# Patient Record
Sex: MALE | Race: WHITE | NOT HISPANIC OR LATINO | Employment: OTHER | URBAN - METROPOLITAN AREA
[De-identification: names, ages, dates, MRNs, and addresses within clinical notes are randomized per-mention and may not be internally consistent; named-entity substitution may affect disease eponyms.]

---

## 2017-01-03 ENCOUNTER — LAB (OUTPATIENT)
Dept: LAB | Facility: CLINIC | Age: 82
End: 2017-01-03
Payer: MEDICARE

## 2017-01-03 ENCOUNTER — ALLSCRIPTS OFFICE VISIT (OUTPATIENT)
Dept: OTHER | Facility: OTHER | Age: 82
End: 2017-01-03

## 2017-01-03 DIAGNOSIS — C83.10 MANTLE CELL LYMPHOMA (HCC): ICD-10-CM

## 2017-01-03 LAB
ALBUMIN SERPL BCP-MCNC: 3.4 G/DL (ref 3.5–5)
ALP SERPL-CCNC: 230 U/L (ref 46–116)
ALT SERPL W P-5'-P-CCNC: 20 U/L (ref 12–78)
ANION GAP SERPL CALCULATED.3IONS-SCNC: 9 MMOL/L (ref 4–13)
AST SERPL W P-5'-P-CCNC: 94 U/L (ref 5–45)
BILIRUB SERPL-MCNC: 0.39 MG/DL (ref 0.2–1)
BUN SERPL-MCNC: 13 MG/DL (ref 5–25)
CALCIUM SERPL-MCNC: 10.1 MG/DL (ref 8.3–10.1)
CHLORIDE SERPL-SCNC: 98 MMOL/L (ref 100–108)
CO2 SERPL-SCNC: 29 MMOL/L (ref 21–32)
CREAT SERPL-MCNC: 1.14 MG/DL (ref 0.6–1.3)
GFR SERPL CREATININE-BSD FRML MDRD: >60 ML/MIN/1.73SQ M
GLUCOSE SERPL-MCNC: 104 MG/DL (ref 65–140)
POTASSIUM SERPL-SCNC: 4.1 MMOL/L (ref 3.5–5.3)
PROT SERPL-MCNC: 7.1 G/DL (ref 6.4–8.2)
SODIUM SERPL-SCNC: 136 MMOL/L (ref 136–145)

## 2017-01-03 PROCEDURE — 85025 COMPLETE CBC W/AUTO DIFF WBC: CPT

## 2017-01-03 PROCEDURE — 85007 BL SMEAR W/DIFF WBC COUNT: CPT

## 2017-01-03 PROCEDURE — 80053 COMPREHEN METABOLIC PANEL: CPT

## 2017-01-03 PROCEDURE — 86850 RBC ANTIBODY SCREEN: CPT | Performed by: INTERNAL MEDICINE

## 2017-01-03 PROCEDURE — 86900 BLOOD TYPING SEROLOGIC ABO: CPT | Performed by: INTERNAL MEDICINE

## 2017-01-03 PROCEDURE — 86901 BLOOD TYPING SEROLOGIC RH(D): CPT | Performed by: INTERNAL MEDICINE

## 2017-01-03 PROCEDURE — 36415 COLL VENOUS BLD VENIPUNCTURE: CPT

## 2017-01-03 PROCEDURE — 85027 COMPLETE CBC AUTOMATED: CPT

## 2017-01-04 ENCOUNTER — LAB REQUISITION (OUTPATIENT)
Dept: LAB | Facility: HOSPITAL | Age: 82
End: 2017-01-04
Payer: MEDICARE

## 2017-01-04 DIAGNOSIS — D64.9 ANEMIA: ICD-10-CM

## 2017-01-04 LAB
ABO GROUP BLD: NORMAL
ACANTHOCYTES BLD QL SMEAR: PRESENT
ANISOCYTOSIS BLD QL SMEAR: PRESENT
BASOPHILS NFR MAR MANUAL: 1 % (ref 0–1)
BLASTS NFR BLD MANUAL: 7 %
BLD GP AB SCN SERPL QL: NEGATIVE
ERYTHROCYTE [DISTWIDTH] IN BLOOD BY AUTOMATED COUNT: 19 % (ref 11.6–15.1)
HCT VFR BLD AUTO: 25.1 % (ref 36.5–49.3)
HGB BLD-MCNC: 7.7 G/DL (ref 12–17)
HYPERCHROMIA BLD QL SMEAR: PRESENT
LYMPHOCYTES # BLD AUTO: 72 % (ref 14–44)
MACROCYTES BLD QL AUTO: PRESENT
MCH RBC QN AUTO: 32 PG (ref 26.8–34.3)
MCHC RBC AUTO-ENTMCNC: 30.7 G/DL (ref 31.4–37.4)
MCV RBC AUTO: 104 FL (ref 82–98)
METAMYELOCYTES NFR BLD MANUAL: 2 % (ref 0–1)
MONOCYTES NFR BLD: 4 % (ref 4–12)
MYELOCYTES NFR BLD MANUAL: 3 % (ref 0–1)
NEUTS SEG NFR BLD AUTO: 10 % (ref 43–75)
NRBC BLD AUTO-RTO: 1 /100 WBCS
PLATELET # BLD AUTO: 84 THOUSANDS/UL (ref 149–390)
PLATELET BLD QL SMEAR: ABNORMAL
PMV BLD AUTO: 10.1 FL (ref 8.9–12.7)
POIKILOCYTOSIS BLD QL SMEAR: PRESENT
RBC # BLD AUTO: 2.41 MILLION/UL (ref 3.88–5.62)
RH BLD: POSITIVE
TOTAL CELLS COUNTED SPEC: 100
VARIANT LYMPHS # BLD AUTO: 2 %
WBC # BLD AUTO: 222.08 THOUSAND/UL (ref 4.31–10.16)

## 2017-01-05 ENCOUNTER — HOSPITAL ENCOUNTER (OUTPATIENT)
Dept: INFUSION CENTER | Facility: CLINIC | Age: 82
Discharge: HOME | End: 2017-01-05
Payer: MEDICARE

## 2017-01-05 VITALS
TEMPERATURE: 97.8 F | SYSTOLIC BLOOD PRESSURE: 120 MMHG | DIASTOLIC BLOOD PRESSURE: 76 MMHG | RESPIRATION RATE: 18 BRPM | HEART RATE: 98 BPM

## 2017-01-05 PROCEDURE — 96365 THER/PROPH/DIAG IV INF INIT: CPT

## 2017-01-05 PROCEDURE — P9040 RBC LEUKOREDUCED IRRADIATED: HCPCS

## 2017-01-05 PROCEDURE — 86920 COMPATIBILITY TEST SPIN: CPT

## 2017-01-05 PROCEDURE — 36430 TRANSFUSION BLD/BLD COMPNT: CPT

## 2017-01-05 RX ORDER — DOXORUBICIN HYDROCHLORIDE 2 MG/ML
44 INJECTION, SOLUTION INTRAVENOUS ONCE
Status: COMPLETED | OUTPATIENT
Start: 2017-01-06 | End: 2017-01-06

## 2017-01-05 RX ORDER — SODIUM CHLORIDE 9 MG/ML
20 INJECTION, SOLUTION INTRAVENOUS CONTINUOUS
Status: DISCONTINUED | OUTPATIENT
Start: 2017-01-06 | End: 2017-01-09 | Stop reason: HOSPADM

## 2017-01-05 RX ORDER — SODIUM CHLORIDE 9 MG/ML
20 INJECTION, SOLUTION INTRAVENOUS CONTINUOUS
Status: DISCONTINUED | OUTPATIENT
Start: 2017-01-05 | End: 2017-01-08 | Stop reason: HOSPADM

## 2017-01-05 RX ORDER — ACETAMINOPHEN 325 MG/1
650 TABLET ORAL ONCE
Status: COMPLETED | OUTPATIENT
Start: 2017-01-05 | End: 2017-01-05

## 2017-01-05 RX ADMIN — SODIUM CHLORIDE 20 ML/HR: 0.9 INJECTION, SOLUTION INTRAVENOUS at 11:04

## 2017-01-05 RX ADMIN — DIPHENHYDRAMINE HYDROCHLORIDE 25 MG: 50 INJECTION, SOLUTION INTRAMUSCULAR; INTRAVENOUS at 11:03

## 2017-01-05 RX ADMIN — Medication 300 UNITS: at 15:15

## 2017-01-05 RX ADMIN — ACETAMINOPHEN 650 MG: 325 TABLET, FILM COATED ORAL at 10:51

## 2017-01-05 NOTE — PLAN OF CARE
Problem: Potential for Falls  Goal: Patient will remain free of falls  INTERVENTIONS:  - Assess patient frequently for physical needs  - Identify cognitive and physical deficits and behaviors that affect risk of falls    - [place] fall precautions as indicated by assessment   - Educate patient/family on patient safety including physical limitations  - Instruct patient to call for assistance with activity based on assessment  - Modify environment to reduce risk of injury  - Consider OT/PT consult to assist with strengthening/mobility   Outcome: Progressing

## 2017-01-05 NOTE — PROGRESS NOTES
Pt tolerated transfusion of 2U PRBC well without any adverse reaction, left unit in stable condition accompanied by his wife  Post blood transfusion d/c instructions given  Pt aware of appt tomorrow in [address] [place] [address] for chemotx

## 2017-01-06 ENCOUNTER — HOSPITAL ENCOUNTER (OUTPATIENT)
Dept: INFUSION CENTER | Facility: CLINIC | Age: 82
Discharge: HOME | End: 2017-01-06
Payer: MEDICARE

## 2017-01-06 VITALS
OXYGEN SATURATION: 96 % | RESPIRATION RATE: 16 BRPM | SYSTOLIC BLOOD PRESSURE: 124 MMHG | BODY MASS INDEX: 28.7 KG/M2 | HEART RATE: 109 BPM | WEIGHT: 162 LBS | DIASTOLIC BLOOD PRESSURE: 58 MMHG | TEMPERATURE: 97.4 F | HEIGHT: 63 IN

## 2017-01-06 LAB
ABO GROUP BLD BPU: NORMAL
ABO GROUP BLD BPU: NORMAL
ANISOCYTOSIS BLD QL SMEAR: PRESENT
BASOPHILS # BLD MANUAL: 0 THOUSAND/UL (ref 0–0.1)
BASOPHILS NFR MAR MANUAL: 0 % (ref 0–1)
BLASTS NFR BLD MANUAL: 61 %
BPU ID: NORMAL
BPU ID: NORMAL
CROSSMATCH: NORMAL
CROSSMATCH: NORMAL
EOSINOPHIL # BLD MANUAL: 2.81 THOUSAND/UL (ref 0–0.4)
EOSINOPHIL NFR BLD MANUAL: 1 % (ref 0–6)
ERYTHROCYTE [DISTWIDTH] IN BLOOD BY AUTOMATED COUNT: 21 % (ref 11.6–15.1)
HCT VFR BLD AUTO: 28 % (ref 36.5–49.3)
HGB BLD-MCNC: 9 G/DL (ref 12–17)
LYMPHOCYTES # BLD AUTO: 14 % (ref 14–44)
LYMPHOCYTES # BLD AUTO: 75.79 THOUSAND/UL (ref 0.6–4.47)
MCH RBC QN AUTO: 31.5 PG (ref 26.8–34.3)
MCHC RBC AUTO-ENTMCNC: 32.1 G/DL (ref 31.4–37.4)
MCV RBC AUTO: 98 FL (ref 82–98)
METAMYELOCYTES NFR BLD MANUAL: 3 % (ref 0–1)
MONOCYTES # BLD AUTO: 14.04 THOUSAND/UL (ref 0–1.22)
MONOCYTES NFR BLD: 5 % (ref 4–12)
NEUTROPHILS # BLD MANUAL: 8.42 THOUSAND/UL (ref 1.85–7.62)
NEUTS SEG NFR BLD AUTO: 3 % (ref 43–75)
NRBC BLD AUTO-RTO: 2 /100 WBC (ref 0–2)
PLATELET # BLD AUTO: 70 THOUSANDS/UL (ref 149–390)
PLATELET BLD QL SMEAR: ABNORMAL
PMV BLD AUTO: 8.8 FL (ref 8.9–12.7)
POIKILOCYTOSIS BLD QL SMEAR: PRESENT
PROMYELOCYTES NFR BLD MANUAL: 2 % (ref 0–0)
RBC # BLD AUTO: 2.86 MILLION/UL (ref 3.88–5.62)
TOTAL CELLS COUNTED SPEC: 100
UNIT DISPENSE STATUS: NORMAL
UNIT DISPENSE STATUS: NORMAL
UNIT PRODUCT CODE: NORMAL
UNIT PRODUCT CODE: NORMAL
UNIT RH: NORMAL
UNIT RH: NORMAL
VARIANT LYMPHS # BLD AUTO: 11 %
WBC # BLD AUTO: 280.7 THOUSAND/UL (ref 4.31–10.16)

## 2017-01-06 PROCEDURE — 85007 BL SMEAR W/DIFF WBC COUNT: CPT | Performed by: INTERNAL MEDICINE

## 2017-01-06 PROCEDURE — 85027 COMPLETE CBC AUTOMATED: CPT | Performed by: INTERNAL MEDICINE

## 2017-01-06 PROCEDURE — 96413 CHEMO IV INFUSION 1 HR: CPT

## 2017-01-06 PROCEDURE — 96417 CHEMO IV INFUS EACH ADDL SEQ: CPT

## 2017-01-06 PROCEDURE — 96367 TX/PROPH/DG ADDL SEQ IV INF: CPT

## 2017-01-06 PROCEDURE — 96411 CHEMO IV PUSH ADDL DRUG: CPT

## 2017-01-06 RX ADMIN — SODIUM CHLORIDE 150 MG: 0.9 INJECTION, SOLUTION INTRAVENOUS at 10:29

## 2017-01-06 RX ADMIN — VINCRISTINE SULFATE 1 MG: 1 INJECTION, SOLUTION INTRAVENOUS at 11:57

## 2017-01-06 RX ADMIN — SODIUM CHLORIDE 6 MG: 9 INJECTION, SOLUTION INTRAVENOUS at 09:23

## 2017-01-06 RX ADMIN — ONDANSETRON: 2 INJECTION INTRAMUSCULAR; INTRAVENOUS at 09:55

## 2017-01-06 RX ADMIN — SODIUM CHLORIDE 20 ML/HR: 0.9 INJECTION, SOLUTION INTRAVENOUS at 08:39

## 2017-01-06 RX ADMIN — HEPARIN 300 UNITS: 100 SYRINGE at 12:27

## 2017-01-06 RX ADMIN — CYCLOPHOSPHAMIDE 712 MG: 1 INJECTION, POWDER, FOR SOLUTION INTRAVENOUS; ORAL at 11:06

## 2017-01-06 RX ADMIN — DOXORUBICIN HYDROCHLORIDE 44 MG: 2 INJECTION, SOLUTION INTRAVENOUS at 11:39

## 2017-01-06 RX ADMIN — DIPHENHYDRAMINE HYDROCHLORIDE 25 MG: 50 INJECTION, SOLUTION INTRAMUSCULAR; INTRAVENOUS at 10:13

## 2017-01-06 NOTE — PROGRESS NOTES
Port accessed for chemo infusion & lab testing  TWQ=483 7 & PLT=70  Notified Davidson Johnston aware & Hyun Nevarez to treat  Tolerated infusion w/out any adverse effects   Refused AVS  Left via w/c accompanied by wife

## 2017-01-09 ENCOUNTER — HOSPITAL ENCOUNTER (OUTPATIENT)
Dept: CT IMAGING | Facility: HOSPITAL | Age: 82
Discharge: HOME | End: 2017-01-09
Attending: INTERNAL MEDICINE
Payer: MEDICARE

## 2017-01-09 ENCOUNTER — GENERIC CONVERSION - ENCOUNTER (OUTPATIENT)
Dept: OTHER | Facility: OTHER | Age: 82
End: 2017-01-09

## 2017-01-09 ENCOUNTER — HOSPITAL ENCOUNTER (OUTPATIENT)
Dept: MRI IMAGING | Facility: CLINIC | Age: 82
Discharge: HOME | End: 2017-01-09
Payer: MEDICARE

## 2017-01-09 VITALS
WEIGHT: 159 LBS | HEIGHT: 63 IN | DIASTOLIC BLOOD PRESSURE: 64 MMHG | SYSTOLIC BLOOD PRESSURE: 123 MMHG | BODY MASS INDEX: 28.17 KG/M2 | RESPIRATION RATE: 18 BRPM | TEMPERATURE: 97.2 F | OXYGEN SATURATION: 97 % | HEART RATE: 74 BPM

## 2017-01-09 DIAGNOSIS — D69.6 THROMBOCYTOPENIA (HCC): ICD-10-CM

## 2017-01-09 DIAGNOSIS — C83.10: ICD-10-CM

## 2017-01-09 DIAGNOSIS — M54.16 RADICULOPATHY OF LUMBAR REGION: ICD-10-CM

## 2017-01-09 DIAGNOSIS — C83.10 MANTLE CELL LYMPHOMA (HCC): ICD-10-CM

## 2017-01-09 LAB
ANISOCYTOSIS BLD QL SMEAR: PRESENT
BASOPHILS # BLD MANUAL: 0 THOUSAND/UL (ref 0–0.1)
BASOPHILS NFR MAR MANUAL: 0 % (ref 0–1)
BLASTS NFR BLD MANUAL: 20 %
EOSINOPHIL # BLD MANUAL: 0 THOUSAND/UL (ref 0–0.4)
EOSINOPHIL NFR BLD MANUAL: 0 % (ref 0–6)
ERYTHROCYTE [DISTWIDTH] IN BLOOD BY AUTOMATED COUNT: 18.7 % (ref 11.6–15.1)
HCT VFR BLD AUTO: 30.5 % (ref 36.5–49.3)
HGB BLD-MCNC: 9.5 G/DL (ref 12–17)
LYMPHOCYTES # BLD AUTO: 14 % (ref 14–44)
LYMPHOCYTES # BLD AUTO: 7.53 THOUSAND/UL (ref 0.6–4.47)
MCH RBC QN AUTO: 30.5 PG (ref 26.8–34.3)
MCHC RBC AUTO-ENTMCNC: 31.1 G/DL (ref 31.4–37.4)
MCV RBC AUTO: 98 FL (ref 82–98)
MONOCYTES # BLD AUTO: 0 THOUSAND/UL (ref 0–1.22)
MONOCYTES NFR BLD: 0 % (ref 4–12)
NEUTROPHILS # BLD MANUAL: 12.37 THOUSAND/UL (ref 1.85–7.62)
NEUTS BAND NFR BLD MANUAL: 4 % (ref 0–8)
NEUTS SEG NFR BLD AUTO: 19 % (ref 43–75)
PLATELET # BLD AUTO: 30 THOUSANDS/UL (ref 149–390)
PLATELET BLD QL SMEAR: ABNORMAL
PMV BLD AUTO: 10.1 FL (ref 8.9–12.7)
POIKILOCYTOSIS BLD QL SMEAR: PRESENT
RBC # BLD AUTO: 3.11 MILLION/UL (ref 3.88–5.62)
TOTAL CELLS COUNTED SPEC: 100
VARIANT LYMPHS # BLD AUTO: 43 %
WBC # BLD AUTO: 53.79 THOUSAND/UL (ref 4.31–10.16)

## 2017-01-09 PROCEDURE — 88305 TISSUE EXAM BY PATHOLOGIST: CPT | Performed by: INTERNAL MEDICINE

## 2017-01-09 PROCEDURE — 38221 DX BONE MARROW BIOPSIES: CPT

## 2017-01-09 PROCEDURE — 88313 SPECIAL STAINS GROUP 2: CPT | Performed by: INTERNAL MEDICINE

## 2017-01-09 PROCEDURE — 88333 PATH CONSLTJ SURG CYTO XM 1: CPT | Performed by: INTERNAL MEDICINE

## 2017-01-09 PROCEDURE — 88237 TISSUE CULTURE BONE MARROW: CPT | Performed by: INTERNAL MEDICINE

## 2017-01-09 PROCEDURE — 38220 DX BONE MARROW ASPIRATIONS: CPT

## 2017-01-09 PROCEDURE — 88360 TUMOR IMMUNOHISTOCHEM/MANUAL: CPT | Performed by: INTERNAL MEDICINE

## 2017-01-09 PROCEDURE — 88342 IMHCHEM/IMCYTCHM 1ST ANTB: CPT | Performed by: INTERNAL MEDICINE

## 2017-01-09 PROCEDURE — 99153 MOD SED SAME PHYS/QHP EA: CPT

## 2017-01-09 PROCEDURE — 85027 COMPLETE CBC AUTOMATED: CPT | Performed by: INTERNAL MEDICINE

## 2017-01-09 PROCEDURE — 88365 INSITU HYBRIDIZATION (FISH): CPT | Performed by: INTERNAL MEDICINE

## 2017-01-09 PROCEDURE — 99152 MOD SED SAME PHYS/QHP 5/>YRS: CPT

## 2017-01-09 PROCEDURE — 88374 M/PHMTRC ALYS ISHQUANT/SEMIQ: CPT | Performed by: INTERNAL MEDICINE

## 2017-01-09 PROCEDURE — 81406 MOPATH PROCEDURE LEVEL 7: CPT | Performed by: INTERNAL MEDICINE

## 2017-01-09 PROCEDURE — 85007 BL SMEAR W/DIFF WBC COUNT: CPT | Performed by: INTERNAL MEDICINE

## 2017-01-09 PROCEDURE — 88185 FLOWCYTOMETRY/TC ADD-ON: CPT

## 2017-01-09 PROCEDURE — 77012 CT SCAN FOR NEEDLE BIOPSY: CPT

## 2017-01-09 PROCEDURE — 88341 IMHCHEM/IMCYTCHM EA ADD ANTB: CPT | Performed by: INTERNAL MEDICINE

## 2017-01-09 PROCEDURE — A9585 GADOBUTROL INJECTION: HCPCS | Performed by: INTERNAL MEDICINE

## 2017-01-09 PROCEDURE — 81450 HL NEO GSAP 5-50DNA/DNA&RNA: CPT | Performed by: INTERNAL MEDICINE

## 2017-01-09 PROCEDURE — 88280 CHROMOSOME KARYOTYPE STUDY: CPT | Performed by: INTERNAL MEDICINE

## 2017-01-09 PROCEDURE — 88184 FLOWCYTOMETRY/ TC 1 MARKER: CPT | Performed by: INTERNAL MEDICINE

## 2017-01-09 PROCEDURE — 85097 BONE MARROW INTERPRETATION: CPT | Performed by: INTERNAL MEDICINE

## 2017-01-09 PROCEDURE — 72158 MRI LUMBAR SPINE W/O & W/DYE: CPT

## 2017-01-09 PROCEDURE — 88311 DECALCIFY TISSUE: CPT | Performed by: INTERNAL MEDICINE

## 2017-01-09 RX ORDER — MIDAZOLAM HYDROCHLORIDE 1 MG/ML
INJECTION INTRAMUSCULAR; INTRAVENOUS CODE/TRAUMA/SEDATION MEDICATION
Status: COMPLETED | OUTPATIENT
Start: 2017-01-09 | End: 2017-01-09

## 2017-01-09 RX ORDER — FENTANYL CITRATE 50 UG/ML
INJECTION, SOLUTION INTRAMUSCULAR; INTRAVENOUS CODE/TRAUMA/SEDATION MEDICATION
Status: COMPLETED | OUTPATIENT
Start: 2017-01-09 | End: 2017-01-09

## 2017-01-09 RX ORDER — SODIUM CHLORIDE 9 MG/ML
75 INJECTION, SOLUTION INTRAVENOUS CONTINUOUS
Status: DISCONTINUED | OUTPATIENT
Start: 2017-01-09 | End: 2017-01-10 | Stop reason: HOSPADM

## 2017-01-09 RX ADMIN — MIDAZOLAM HYDROCHLORIDE 1 MG: 1 INJECTION, SOLUTION INTRAMUSCULAR; INTRAVENOUS at 08:34

## 2017-01-09 RX ADMIN — FENTANYL CITRATE 50 MCG: 50 INJECTION INTRAMUSCULAR; INTRAVENOUS at 08:34

## 2017-01-09 RX ADMIN — SODIUM CHLORIDE 75 ML/HR: 0.9 INJECTION, SOLUTION INTRAVENOUS at 07:59

## 2017-01-09 RX ADMIN — GADOBUTROL 7 ML: 604.72 INJECTION INTRAVENOUS at 14:54

## 2017-01-11 LAB — SCAN RESULT: NORMAL

## 2017-01-11 RX ORDER — SODIUM CHLORIDE 9 MG/ML
20 INJECTION, SOLUTION INTRAVENOUS CONTINUOUS
Status: DISCONTINUED | OUTPATIENT
Start: 2017-01-12 | End: 2017-01-15 | Stop reason: HOSPADM

## 2017-01-12 ENCOUNTER — HOSPITAL ENCOUNTER (OUTPATIENT)
Dept: INFUSION CENTER | Facility: CLINIC | Age: 82
Discharge: HOME | End: 2017-01-12
Payer: MEDICARE

## 2017-01-12 ENCOUNTER — ALLSCRIPTS OFFICE VISIT (OUTPATIENT)
Dept: OTHER | Facility: OTHER | Age: 82
End: 2017-01-12

## 2017-01-12 VITALS
HEART RATE: 100 BPM | TEMPERATURE: 97.9 F | RESPIRATION RATE: 18 BRPM | DIASTOLIC BLOOD PRESSURE: 52 MMHG | SYSTOLIC BLOOD PRESSURE: 110 MMHG

## 2017-01-12 LAB
ABO GROUP BLD BPU: NORMAL
ANISOCYTOSIS BLD QL SMEAR: PRESENT
BASOPHILS # BLD MANUAL: 0 THOUSAND/UL (ref 0–0.1)
BASOPHILS NFR MAR MANUAL: 0 % (ref 0–1)
BLASTS NFR BLD MANUAL: 13 %
BPU ID: NORMAL
EOSINOPHIL # BLD MANUAL: 0 THOUSAND/UL (ref 0–0.4)
EOSINOPHIL NFR BLD MANUAL: 0 % (ref 0–6)
ERYTHROCYTE [DISTWIDTH] IN BLOOD BY AUTOMATED COUNT: 17.8 % (ref 11.6–15.1)
HCT VFR BLD AUTO: 29.9 % (ref 36.5–49.3)
HGB BLD-MCNC: 9.2 G/DL (ref 12–17)
LYMPHOCYTES # BLD AUTO: 10.94 THOUSAND/UL (ref 0.6–4.47)
LYMPHOCYTES # BLD AUTO: 39 % (ref 14–44)
MCH RBC QN AUTO: 30.3 PG (ref 26.8–34.3)
MCHC RBC AUTO-ENTMCNC: 30.8 G/DL (ref 31.4–37.4)
MCV RBC AUTO: 98 FL (ref 82–98)
MONOCYTES # BLD AUTO: 3.36 THOUSAND/UL (ref 0–1.22)
MONOCYTES NFR BLD: 12 % (ref 4–12)
NEUTROPHILS # BLD MANUAL: 3.65 THOUSAND/UL (ref 1.85–7.62)
NEUTS SEG NFR BLD AUTO: 13 % (ref 43–75)
NRBC BLD AUTO-RTO: 1 /100 WBC (ref 0–2)
PLATELET # BLD AUTO: 22 THOUSANDS/UL (ref 149–390)
PLATELET BLD QL SMEAR: ABNORMAL
PMV BLD AUTO: 11.5 FL (ref 8.9–12.7)
RBC # BLD AUTO: 3.04 MILLION/UL (ref 3.88–5.62)
TOTAL CELLS COUNTED SPEC: 100
UNIT DISPENSE STATUS: NORMAL
UNIT PRODUCT CODE: NORMAL
UNIT RH: NORMAL
VARIANT LYMPHS # BLD AUTO: 23 %
WBC # BLD AUTO: 28.04 THOUSAND/UL (ref 4.31–10.16)

## 2017-01-12 PROCEDURE — P9037 PLATE PHERES LEUKOREDU IRRAD: HCPCS

## 2017-01-12 PROCEDURE — 36430 TRANSFUSION BLD/BLD COMPNT: CPT

## 2017-01-12 PROCEDURE — 85027 COMPLETE CBC AUTOMATED: CPT | Performed by: INTERNAL MEDICINE

## 2017-01-12 PROCEDURE — 96365 THER/PROPH/DIAG IV INF INIT: CPT

## 2017-01-12 PROCEDURE — 85007 BL SMEAR W/DIFF WBC COUNT: CPT | Performed by: INTERNAL MEDICINE

## 2017-01-12 RX ORDER — ACETAMINOPHEN 325 MG/1
650 TABLET ORAL ONCE
Status: COMPLETED | OUTPATIENT
Start: 2017-01-12 | End: 2017-01-12

## 2017-01-12 RX ADMIN — HEPARIN 300 UNITS: 100 SYRINGE at 10:00

## 2017-01-12 RX ADMIN — DIPHENHYDRAMINE HYDROCHLORIDE 25 MG: 50 INJECTION, SOLUTION INTRAMUSCULAR; INTRAVENOUS at 09:07

## 2017-01-12 RX ADMIN — ACETAMINOPHEN 650 MG: 325 TABLET ORAL at 09:30

## 2017-01-12 RX ADMIN — SODIUM CHLORIDE 20 ML/HR: 900 INJECTION, SOLUTION INTRAVENOUS at 08:15

## 2017-01-13 ENCOUNTER — HOSPITAL ENCOUNTER (OUTPATIENT)
Dept: NON INVASIVE DIAGNOSTICS | Facility: CLINIC | Age: 82
Discharge: HOME | End: 2017-01-13
Payer: MEDICARE

## 2017-01-13 DIAGNOSIS — C83.10 MANTLE CELL LYMPHOMA (HCC): ICD-10-CM

## 2017-01-13 LAB
ABO GROUP BLD BPU: NORMAL
BPU ID: NORMAL
UNIT DISPENSE STATUS: NORMAL
UNIT PRODUCT CODE: NORMAL
UNIT RH: NORMAL

## 2017-01-13 PROCEDURE — 93306 TTE W/DOPPLER COMPLETE: CPT

## 2017-01-16 LAB
MISCELLANEOUS LAB TEST RESULT: NORMAL
MISCELLANEOUS LAB TEST RESULT: NORMAL

## 2017-01-17 ENCOUNTER — TRANSCRIBE ORDERS (OUTPATIENT)
Dept: LAB | Facility: CLINIC | Age: 82
End: 2017-01-17

## 2017-01-17 ENCOUNTER — APPOINTMENT (OUTPATIENT)
Dept: LAB | Facility: CLINIC | Age: 82
End: 2017-01-17
Payer: MEDICARE

## 2017-01-17 DIAGNOSIS — C83.10 MANTLE CELL LYMPHOMA (HCC): ICD-10-CM

## 2017-01-17 LAB
ALBUMIN SERPL BCP-MCNC: 3.4 G/DL (ref 3.5–5)
ALP SERPL-CCNC: 116 U/L (ref 46–116)
ALT SERPL W P-5'-P-CCNC: 21 U/L (ref 12–78)
ANION GAP SERPL CALCULATED.3IONS-SCNC: 6 MMOL/L (ref 4–13)
ANISOCYTOSIS BLD QL SMEAR: PRESENT
AST SERPL W P-5'-P-CCNC: 14 U/L (ref 5–45)
BASOPHILS # BLD MANUAL: 0 THOUSAND/UL (ref 0–0.1)
BASOPHILS NFR MAR MANUAL: 0 % (ref 0–1)
BILIRUB SERPL-MCNC: 0.49 MG/DL (ref 0.2–1)
BLASTS NFR BLD MANUAL: 15 %
BUN SERPL-MCNC: 18 MG/DL (ref 5–25)
CALCIUM SERPL-MCNC: 10.1 MG/DL (ref 8.3–10.1)
CHLORIDE SERPL-SCNC: 98 MMOL/L (ref 100–108)
CO2 SERPL-SCNC: 30 MMOL/L (ref 21–32)
CREAT SERPL-MCNC: 1.05 MG/DL (ref 0.6–1.3)
EOSINOPHIL # BLD MANUAL: 0 THOUSAND/UL (ref 0–0.4)
EOSINOPHIL NFR BLD MANUAL: 0 % (ref 0–6)
ERYTHROCYTE [DISTWIDTH] IN BLOOD BY AUTOMATED COUNT: 18.6 % (ref 11.6–15.1)
GFR SERPL CREATININE-BSD FRML MDRD: >60 ML/MIN/1.73SQ M
GLUCOSE SERPL-MCNC: 99 MG/DL (ref 65–140)
HCT VFR BLD AUTO: 29.1 % (ref 36.5–49.3)
HGB BLD-MCNC: 9.2 G/DL (ref 12–17)
LYMPHOCYTES # BLD AUTO: 20.74 THOUSAND/UL (ref 0.6–4.47)
LYMPHOCYTES # BLD AUTO: 70 % (ref 14–44)
MCH RBC QN AUTO: 31.1 PG (ref 26.8–34.3)
MCHC RBC AUTO-ENTMCNC: 31.6 G/DL (ref 31.4–37.4)
MCV RBC AUTO: 98 FL (ref 82–98)
METAMYELOCYTES NFR BLD MANUAL: 1 % (ref 0–1)
MISCELLANEOUS LAB TEST RESULT: NORMAL
MONOCYTES # BLD AUTO: 0 THOUSAND/UL (ref 0–1.22)
MONOCYTES NFR BLD: 0 % (ref 4–12)
MYELOCYTES NFR BLD MANUAL: 1 % (ref 0–1)
NEUTROPHILS # BLD MANUAL: 0.59 THOUSAND/UL (ref 1.85–7.62)
NEUTS SEG NFR BLD AUTO: 2 % (ref 43–75)
NRBC BLD AUTO-RTO: 0 /100 WBCS
PLATELET # BLD AUTO: 38 THOUSANDS/UL (ref 149–390)
PLATELET BLD QL SMEAR: ABNORMAL
PMV BLD AUTO: 11 FL (ref 8.9–12.7)
POIKILOCYTOSIS BLD QL SMEAR: PRESENT
POTASSIUM SERPL-SCNC: 4.5 MMOL/L (ref 3.5–5.3)
PROT SERPL-MCNC: 7.1 G/DL (ref 6.4–8.2)
RBC # BLD AUTO: 2.96 MILLION/UL (ref 3.88–5.62)
RBC MORPH BLD: PRESENT
SCAN RESULT: NORMAL
SMUDGE CELLS BLD QL SMEAR: PRESENT
SODIUM SERPL-SCNC: 134 MMOL/L (ref 136–145)
VARIANT LYMPHS # BLD AUTO: 11 %
WBC # BLD AUTO: 29.63 THOUSAND/UL (ref 4.31–10.16)

## 2017-01-17 PROCEDURE — 85027 COMPLETE CBC AUTOMATED: CPT

## 2017-01-17 PROCEDURE — 80053 COMPREHEN METABOLIC PANEL: CPT

## 2017-01-17 PROCEDURE — 85007 BL SMEAR W/DIFF WBC COUNT: CPT

## 2017-01-17 PROCEDURE — 36415 COLL VENOUS BLD VENIPUNCTURE: CPT

## 2017-01-20 ENCOUNTER — APPOINTMENT (EMERGENCY)
Dept: RADIOLOGY | Facility: HOSPITAL | Age: 82
DRG: 841 | End: 2017-01-20
Payer: MEDICARE

## 2017-01-20 ENCOUNTER — HOSPITAL ENCOUNTER (INPATIENT)
Facility: HOSPITAL | Age: 82
LOS: 2 days | DRG: 841 | End: 2017-01-22
Attending: EMERGENCY MEDICINE | Admitting: INTERNAL MEDICINE
Payer: MEDICARE

## 2017-01-20 DIAGNOSIS — R63.4 WEIGHT LOSS: ICD-10-CM

## 2017-01-20 DIAGNOSIS — M54.16 LUMBAR RADICULOPATHY: ICD-10-CM

## 2017-01-20 DIAGNOSIS — E86.0 DEHYDRATION: Primary | ICD-10-CM

## 2017-01-20 DIAGNOSIS — R53.83 FATIGUE: ICD-10-CM

## 2017-01-20 DIAGNOSIS — D64.9 ANEMIA: ICD-10-CM

## 2017-01-20 DIAGNOSIS — D72.829 LEUKOCYTOSIS: ICD-10-CM

## 2017-01-20 DIAGNOSIS — C83.10 MANTLE CELL LYMPHOMA (HCC): ICD-10-CM

## 2017-01-20 DIAGNOSIS — N28.9 ACUTE RENAL INSUFFICIENCY: ICD-10-CM

## 2017-01-20 DIAGNOSIS — D69.6 THROMBOCYTOPENIA (HCC): ICD-10-CM

## 2017-01-20 PROBLEM — R29.898 WEAKNESS OF EXTREMITY: Status: ACTIVE | Noted: 2017-01-20

## 2017-01-20 LAB
ALBUMIN SERPL BCP-MCNC: 3.4 G/DL (ref 3.5–5)
ALP SERPL-CCNC: 201 U/L (ref 46–116)
ALT SERPL W P-5'-P-CCNC: 22 U/L (ref 12–78)
ANION GAP SERPL CALCULATED.3IONS-SCNC: 10 MMOL/L (ref 4–13)
AST SERPL W P-5'-P-CCNC: 91 U/L (ref 5–45)
BASOPHILS # BLD MANUAL: 0 THOUSAND/UL (ref 0–0.1)
BASOPHILS NFR MAR MANUAL: 0 % (ref 0–1)
BILIRUB SERPL-MCNC: 0.5 MG/DL (ref 0.2–1)
BUN SERPL-MCNC: 23 MG/DL (ref 5–25)
CALCIUM SERPL-MCNC: 10 MG/DL (ref 8.3–10.1)
CHLORIDE SERPL-SCNC: 95 MMOL/L (ref 100–108)
CO2 SERPL-SCNC: 29 MMOL/L (ref 21–32)
CREAT SERPL-MCNC: 1.46 MG/DL (ref 0.6–1.3)
EOSINOPHIL # BLD MANUAL: 1.33 THOUSAND/UL (ref 0–0.4)
EOSINOPHIL NFR BLD MANUAL: 1 % (ref 0–6)
ERYTHROCYTE [DISTWIDTH] IN BLOOD BY AUTOMATED COUNT: 19.3 % (ref 11.6–15.1)
GFR SERPL CREATININE-BSD FRML MDRD: 46.5 ML/MIN/1.73SQ M
GLUCOSE SERPL-MCNC: 107 MG/DL (ref 65–140)
HCT VFR BLD AUTO: 27.5 % (ref 36.5–49.3)
HGB BLD-MCNC: 8.5 G/DL (ref 12–17)
LYMPHOCYTES # BLD AUTO: 11 % (ref 14–44)
LYMPHOCYTES # BLD AUTO: 14.68 THOUSAND/UL (ref 0.6–4.47)
MAGNESIUM SERPL-MCNC: 2.5 MG/DL (ref 1.6–2.6)
MCH RBC QN AUTO: 30.5 PG (ref 26.8–34.3)
MCHC RBC AUTO-ENTMCNC: 30.9 G/DL (ref 31.4–37.4)
MCV RBC AUTO: 99 FL (ref 82–98)
METAMYELOCYTES NFR BLD MANUAL: 1 % (ref 0–1)
MONOCYTES # BLD AUTO: 8.01 THOUSAND/UL (ref 0–1.22)
MONOCYTES NFR BLD: 6 % (ref 4–12)
NEUTROPHILS # BLD MANUAL: 1.33 THOUSAND/UL (ref 1.85–7.62)
NEUTS SEG NFR BLD AUTO: 1 % (ref 43–75)
NRBC BLD AUTO-RTO: 0 /100 WBCS
PATHOLOGY REVIEW: YES
PHOSPHATE SERPL-MCNC: 1.6 MG/DL (ref 2.3–4.1)
PLATELET # BLD AUTO: 64 THOUSANDS/UL (ref 149–390)
PLATELET BLD QL SMEAR: ABNORMAL
PMV BLD AUTO: 10.5 FL (ref 8.9–12.7)
POTASSIUM SERPL-SCNC: 4.3 MMOL/L (ref 3.5–5.3)
PROT SERPL-MCNC: 7.2 G/DL (ref 6.4–8.2)
RBC # BLD AUTO: 2.79 MILLION/UL (ref 3.88–5.62)
SODIUM SERPL-SCNC: 134 MMOL/L (ref 136–145)
TOTAL CELLS COUNTED SPEC: 100
TROPONIN I SERPL-MCNC: <0.02 NG/ML
UNIDENT CELLS # BLD: 66 % (ref 0–0)
VARIANT LYMPHS # BLD AUTO: 14 %
WBC # BLD AUTO: 133.47 THOUSAND/UL (ref 4.31–10.16)

## 2017-01-20 PROCEDURE — 71020 HB CHEST X-RAY 2VW FRONTAL&LATL: CPT

## 2017-01-20 PROCEDURE — 96374 THER/PROPH/DIAG INJ IV PUSH: CPT

## 2017-01-20 PROCEDURE — 87040 BLOOD CULTURE FOR BACTERIA: CPT | Performed by: INTERNAL MEDICINE

## 2017-01-20 PROCEDURE — 84484 ASSAY OF TROPONIN QUANT: CPT | Performed by: EMERGENCY MEDICINE

## 2017-01-20 PROCEDURE — 93005 ELECTROCARDIOGRAM TRACING: CPT | Performed by: EMERGENCY MEDICINE

## 2017-01-20 PROCEDURE — 96375 TX/PRO/DX INJ NEW DRUG ADDON: CPT

## 2017-01-20 PROCEDURE — 85027 COMPLETE CBC AUTOMATED: CPT | Performed by: EMERGENCY MEDICINE

## 2017-01-20 PROCEDURE — 36415 COLL VENOUS BLD VENIPUNCTURE: CPT | Performed by: EMERGENCY MEDICINE

## 2017-01-20 PROCEDURE — 99285 EMERGENCY DEPT VISIT HI MDM: CPT

## 2017-01-20 PROCEDURE — 96376 TX/PRO/DX INJ SAME DRUG ADON: CPT

## 2017-01-20 PROCEDURE — 80053 COMPREHEN METABOLIC PANEL: CPT | Performed by: EMERGENCY MEDICINE

## 2017-01-20 PROCEDURE — 84100 ASSAY OF PHOSPHORUS: CPT | Performed by: EMERGENCY MEDICINE

## 2017-01-20 PROCEDURE — 36415 COLL VENOUS BLD VENIPUNCTURE: CPT | Performed by: INTERNAL MEDICINE

## 2017-01-20 PROCEDURE — 85007 BL SMEAR W/DIFF WBC COUNT: CPT | Performed by: EMERGENCY MEDICINE

## 2017-01-20 PROCEDURE — 96361 HYDRATE IV INFUSION ADD-ON: CPT

## 2017-01-20 PROCEDURE — 85025 COMPLETE CBC W/AUTO DIFF WBC: CPT | Performed by: EMERGENCY MEDICINE

## 2017-01-20 PROCEDURE — 83735 ASSAY OF MAGNESIUM: CPT | Performed by: EMERGENCY MEDICINE

## 2017-01-20 RX ORDER — ONDANSETRON 2 MG/ML
4 INJECTION INTRAMUSCULAR; INTRAVENOUS ONCE
Status: COMPLETED | OUTPATIENT
Start: 2017-01-20 | End: 2017-01-20

## 2017-01-20 RX ORDER — DUTASTERIDE 0.5 MG/1
0.5 CAPSULE, LIQUID FILLED ORAL DAILY
COMMUNITY
End: 2017-01-25 | Stop reason: HOSPADM

## 2017-01-20 RX ORDER — SODIUM CHLORIDE 9 MG/ML
100 INJECTION, SOLUTION INTRAVENOUS CONTINUOUS
Status: DISCONTINUED | OUTPATIENT
Start: 2017-01-20 | End: 2017-01-21

## 2017-01-20 RX ORDER — DOCUSATE SODIUM 100 MG/1
100 CAPSULE, LIQUID FILLED ORAL 2 TIMES DAILY
Status: DISCONTINUED | OUTPATIENT
Start: 2017-01-20 | End: 2017-01-22 | Stop reason: HOSPADM

## 2017-01-20 RX ORDER — ESCITALOPRAM OXALATE 10 MG/1
10 TABLET ORAL DAILY
COMMUNITY

## 2017-01-20 RX ORDER — MORPHINE SULFATE 4 MG/ML
4 INJECTION, SOLUTION INTRAMUSCULAR; INTRAVENOUS ONCE
Status: COMPLETED | OUTPATIENT
Start: 2017-01-20 | End: 2017-01-20

## 2017-01-20 RX ORDER — ONDANSETRON 4 MG/1
4 TABLET, FILM COATED ORAL EVERY 8 HOURS PRN
COMMUNITY

## 2017-01-20 RX ORDER — LOSARTAN POTASSIUM 25 MG/1
25 TABLET ORAL DAILY
COMMUNITY
End: 2017-01-25 | Stop reason: HOSPADM

## 2017-01-20 RX ORDER — MORPHINE SULFATE 2 MG/ML
2 INJECTION, SOLUTION INTRAMUSCULAR; INTRAVENOUS
Status: DISCONTINUED | OUTPATIENT
Start: 2017-01-20 | End: 2017-01-22 | Stop reason: HOSPADM

## 2017-01-20 RX ORDER — TAMSULOSIN HYDROCHLORIDE 0.4 MG/1
0.4 CAPSULE ORAL
Status: DISCONTINUED | OUTPATIENT
Start: 2017-01-20 | End: 2017-01-22 | Stop reason: HOSPADM

## 2017-01-20 RX ORDER — ONDANSETRON 2 MG/ML
4 INJECTION INTRAMUSCULAR; INTRAVENOUS ONCE AS NEEDED
Status: DISCONTINUED | OUTPATIENT
Start: 2017-01-20 | End: 2017-01-22 | Stop reason: HOSPADM

## 2017-01-20 RX ORDER — ACETAMINOPHEN 325 MG/1
650 TABLET ORAL ONCE
Status: COMPLETED | OUTPATIENT
Start: 2017-01-20 | End: 2017-01-20

## 2017-01-20 RX ORDER — ONDANSETRON 2 MG/ML
4 INJECTION INTRAMUSCULAR; INTRAVENOUS EVERY 6 HOURS PRN
Status: DISCONTINUED | OUTPATIENT
Start: 2017-01-20 | End: 2017-01-22 | Stop reason: HOSPADM

## 2017-01-20 RX ORDER — SIMVASTATIN 10 MG
10 TABLET ORAL
COMMUNITY
End: 2017-01-25 | Stop reason: HOSPADM

## 2017-01-20 RX ORDER — ONDANSETRON 2 MG/ML
INJECTION INTRAMUSCULAR; INTRAVENOUS
Status: COMPLETED
Start: 2017-01-20 | End: 2017-01-20

## 2017-01-20 RX ORDER — ESCITALOPRAM OXALATE 10 MG/1
10 TABLET ORAL DAILY
Status: DISCONTINUED | OUTPATIENT
Start: 2017-01-20 | End: 2017-01-22 | Stop reason: HOSPADM

## 2017-01-20 RX ORDER — ACETAMINOPHEN 325 MG/1
650 TABLET ORAL EVERY 6 HOURS PRN
Status: DISCONTINUED | OUTPATIENT
Start: 2017-01-20 | End: 2017-01-22 | Stop reason: HOSPADM

## 2017-01-20 RX ORDER — OXYCODONE HYDROCHLORIDE 5 MG/1
5 TABLET ORAL EVERY 4 HOURS PRN
Status: DISCONTINUED | OUTPATIENT
Start: 2017-01-20 | End: 2017-01-22 | Stop reason: HOSPADM

## 2017-01-20 RX ORDER — FUROSEMIDE 40 MG/1
40 TABLET ORAL 2 TIMES DAILY
COMMUNITY
End: 2017-01-25 | Stop reason: HOSPADM

## 2017-01-20 RX ORDER — MORPHINE SULFATE 4 MG/ML
4 INJECTION, SOLUTION INTRAMUSCULAR; INTRAVENOUS ONCE AS NEEDED
Status: COMPLETED | OUTPATIENT
Start: 2017-01-20 | End: 2017-01-21

## 2017-01-20 RX ORDER — TAMSULOSIN HYDROCHLORIDE 0.4 MG/1
0.4 CAPSULE ORAL
COMMUNITY
End: 2017-01-25 | Stop reason: HOSPADM

## 2017-01-20 RX ORDER — POLYETHYLENE GLYCOL 3350 17 G/17G
17 POWDER, FOR SOLUTION ORAL DAILY
Status: DISCONTINUED | OUTPATIENT
Start: 2017-01-20 | End: 2017-01-22 | Stop reason: HOSPADM

## 2017-01-20 RX ORDER — POTASSIUM CHLORIDE 750 MG/1
10 TABLET, FILM COATED, EXTENDED RELEASE ORAL 2 TIMES DAILY
COMMUNITY
End: 2017-01-25 | Stop reason: HOSPADM

## 2017-01-20 RX ORDER — SENNOSIDES 8.6 MG
2 TABLET ORAL
Status: DISCONTINUED | OUTPATIENT
Start: 2017-01-20 | End: 2017-01-22 | Stop reason: HOSPADM

## 2017-01-20 RX ADMIN — ONDANSETRON 4 MG: 2 INJECTION INTRAMUSCULAR; INTRAVENOUS at 11:26

## 2017-01-20 RX ADMIN — OXYCODONE HYDROCHLORIDE 5 MG: 5 TABLET ORAL at 21:27

## 2017-01-20 RX ADMIN — ESCITALOPRAM OXALATE 10 MG: 10 TABLET ORAL at 16:24

## 2017-01-20 RX ADMIN — METOPROLOL TARTRATE 25 MG: 25 TABLET ORAL at 16:09

## 2017-01-20 RX ADMIN — MORPHINE SULFATE 2 MG: 2 INJECTION, SOLUTION INTRAMUSCULAR; INTRAVENOUS at 15:06

## 2017-01-20 RX ADMIN — DOCUSATE SODIUM 100 MG: 100 CAPSULE, LIQUID FILLED ORAL at 18:32

## 2017-01-20 RX ADMIN — ACETAMINOPHEN 650 MG: 325 TABLET ORAL at 11:11

## 2017-01-20 RX ADMIN — SODIUM CHLORIDE 1000 ML: 0.9 INJECTION, SOLUTION INTRAVENOUS at 10:34

## 2017-01-20 RX ADMIN — MORPHINE SULFATE 4 MG: 4 INJECTION, SOLUTION INTRAMUSCULAR; INTRAVENOUS at 11:19

## 2017-01-20 RX ADMIN — SENNOSIDES 17.2 MG: 8.6 TABLET, FILM COATED ORAL at 21:29

## 2017-01-20 RX ADMIN — ONDANSETRON 4 MG: 2 INJECTION INTRAMUSCULAR; INTRAVENOUS at 10:34

## 2017-01-20 RX ADMIN — MORPHINE SULFATE 2 MG: 2 INJECTION, SOLUTION INTRAMUSCULAR; INTRAVENOUS at 19:27

## 2017-01-20 RX ADMIN — TAMSULOSIN HYDROCHLORIDE 0.4 MG: 0.4 CAPSULE ORAL at 16:25

## 2017-01-20 RX ADMIN — POLYETHYLENE GLYCOL 3350 17 G: 17 POWDER, FOR SOLUTION ORAL at 16:09

## 2017-01-20 RX ADMIN — SODIUM CHLORIDE 100 ML/HR: 0.9 INJECTION, SOLUTION INTRAVENOUS at 16:13

## 2017-01-20 RX ADMIN — DIBASIC SODIUM PHOSPHATE, MONOBASIC POTASSIUM PHOSPHATE AND MONOBASIC SODIUM PHOSPHATE 1 TABLET: 852; 155; 130 TABLET ORAL at 16:24

## 2017-01-21 LAB
ABO GROUP BLD BPU: NORMAL
ABO GROUP BLD BPU: NORMAL
ABO GROUP BLD: NORMAL
ANION GAP SERPL CALCULATED.3IONS-SCNC: 11 MMOL/L (ref 4–13)
BILIRUB UR QL STRIP: NEGATIVE
BLD GP AB SCN SERPL QL: NEGATIVE
BPU ID: NORMAL
BPU ID: NORMAL
BUN SERPL-MCNC: 21 MG/DL (ref 5–25)
CALCIUM SERPL-MCNC: 8.7 MG/DL (ref 8.3–10.1)
CHLORIDE SERPL-SCNC: 100 MMOL/L (ref 100–108)
CLARITY UR: ABNORMAL
CO2 SERPL-SCNC: 27 MMOL/L (ref 21–32)
COLOR UR: YELLOW
CREAT SERPL-MCNC: 1.12 MG/DL (ref 0.6–1.3)
CROSSMATCH: NORMAL
CROSSMATCH: NORMAL
ERYTHROCYTE [DISTWIDTH] IN BLOOD BY AUTOMATED COUNT: 19.6 % (ref 11.6–15.1)
GFR SERPL CREATININE-BSD FRML MDRD: >60 ML/MIN/1.73SQ M
GLUCOSE SERPL-MCNC: 88 MG/DL (ref 65–140)
GLUCOSE UR STRIP-MCNC: NEGATIVE MG/DL
HCT VFR BLD AUTO: 23.5 % (ref 36.5–49.3)
HGB BLD-MCNC: 7.3 G/DL (ref 12–17)
HGB UR QL STRIP.AUTO: NEGATIVE
KETONES UR STRIP-MCNC: ABNORMAL MG/DL
LEUKOCYTE ESTERASE UR QL STRIP: NEGATIVE
MAGNESIUM SERPL-MCNC: 2.3 MG/DL (ref 1.6–2.6)
MCH RBC QN AUTO: 30.9 PG (ref 26.8–34.3)
MCHC RBC AUTO-ENTMCNC: 31.1 G/DL (ref 31.4–37.4)
MCV RBC AUTO: 100 FL (ref 82–98)
NITRITE UR QL STRIP: NEGATIVE
PH UR STRIP.AUTO: 5.5 [PH] (ref 4.5–8)
PLATELET # BLD AUTO: 53 THOUSANDS/UL (ref 149–390)
PMV BLD AUTO: 9.4 FL (ref 8.9–12.7)
POTASSIUM SERPL-SCNC: 3.6 MMOL/L (ref 3.5–5.3)
PROT UR STRIP-MCNC: NEGATIVE MG/DL
RBC # BLD AUTO: 2.36 MILLION/UL (ref 3.88–5.62)
RH BLD: POSITIVE
SODIUM SERPL-SCNC: 138 MMOL/L (ref 136–145)
SP GR UR STRIP.AUTO: >=1.03 (ref 1–1.03)
TSH SERPL DL<=0.05 MIU/L-ACNC: 1.55 UIU/ML (ref 0.36–3.74)
UNIT DISPENSE STATUS: NORMAL
UNIT DISPENSE STATUS: NORMAL
UNIT PRODUCT CODE: NORMAL
UNIT PRODUCT CODE: NORMAL
UNIT RH: NORMAL
UNIT RH: NORMAL
UROBILINOGEN UR QL STRIP.AUTO: 0.2 E.U./DL
WBC # BLD AUTO: 155.05 THOUSAND/UL (ref 4.31–10.16)

## 2017-01-21 PROCEDURE — 80048 BASIC METABOLIC PNL TOTAL CA: CPT | Performed by: INTERNAL MEDICINE

## 2017-01-21 PROCEDURE — 86920 COMPATIBILITY TEST SPIN: CPT

## 2017-01-21 PROCEDURE — 86901 BLOOD TYPING SEROLOGIC RH(D): CPT | Performed by: INTERNAL MEDICINE

## 2017-01-21 PROCEDURE — 85027 COMPLETE CBC AUTOMATED: CPT | Performed by: INTERNAL MEDICINE

## 2017-01-21 PROCEDURE — 81003 URINALYSIS AUTO W/O SCOPE: CPT | Performed by: INTERNAL MEDICINE

## 2017-01-21 PROCEDURE — 86900 BLOOD TYPING SEROLOGIC ABO: CPT | Performed by: INTERNAL MEDICINE

## 2017-01-21 PROCEDURE — 86850 RBC ANTIBODY SCREEN: CPT | Performed by: INTERNAL MEDICINE

## 2017-01-21 PROCEDURE — 30233N1 TRANSFUSION OF NONAUTOLOGOUS RED BLOOD CELLS INTO PERIPHERAL VEIN, PERCUTANEOUS APPROACH: ICD-10-PCS | Performed by: INTERNAL MEDICINE

## 2017-01-21 PROCEDURE — 84443 ASSAY THYROID STIM HORMONE: CPT | Performed by: INTERNAL MEDICINE

## 2017-01-21 PROCEDURE — 87040 BLOOD CULTURE FOR BACTERIA: CPT | Performed by: INTERNAL MEDICINE

## 2017-01-21 PROCEDURE — 83735 ASSAY OF MAGNESIUM: CPT | Performed by: INTERNAL MEDICINE

## 2017-01-21 RX ORDER — PROMETHAZINE HYDROCHLORIDE 25 MG/ML
25 INJECTION, SOLUTION INTRAMUSCULAR; INTRAVENOUS EVERY 4 HOURS PRN
Status: DISCONTINUED | OUTPATIENT
Start: 2017-01-21 | End: 2017-01-22 | Stop reason: HOSPADM

## 2017-01-21 RX ORDER — ACETAMINOPHEN 325 MG/1
650 TABLET ORAL ONCE
Status: COMPLETED | OUTPATIENT
Start: 2017-01-21 | End: 2017-01-21

## 2017-01-21 RX ORDER — BISACODYL 10 MG
10 SUPPOSITORY, RECTAL RECTAL DAILY PRN
Status: DISCONTINUED | OUTPATIENT
Start: 2017-01-21 | End: 2017-01-22 | Stop reason: HOSPADM

## 2017-01-21 RX ORDER — FUROSEMIDE 10 MG/ML
20 INJECTION INTRAMUSCULAR; INTRAVENOUS ONCE
Status: COMPLETED | OUTPATIENT
Start: 2017-01-21 | End: 2017-01-21

## 2017-01-21 RX ORDER — DIPHENHYDRAMINE HCL 25 MG
12.5 TABLET ORAL ONCE
Status: COMPLETED | OUTPATIENT
Start: 2017-01-21 | End: 2017-01-21

## 2017-01-21 RX ADMIN — ACETAMINOPHEN 650 MG: 325 TABLET ORAL at 11:26

## 2017-01-21 RX ADMIN — PROMETHAZINE HYDROCHLORIDE 25 MG: 25 INJECTION INTRAMUSCULAR; INTRAVENOUS at 11:36

## 2017-01-21 RX ADMIN — DIPHENHYDRAMINE HYDROCHLORIDE 12.5 MG: 25 TABLET ORAL at 11:26

## 2017-01-21 RX ADMIN — MORPHINE SULFATE 4 MG: 4 INJECTION, SOLUTION INTRAMUSCULAR; INTRAVENOUS at 16:40

## 2017-01-21 RX ADMIN — SENNOSIDES 17.2 MG: 8.6 TABLET, FILM COATED ORAL at 21:09

## 2017-01-21 RX ADMIN — MORPHINE SULFATE 2 MG: 2 INJECTION, SOLUTION INTRAMUSCULAR; INTRAVENOUS at 21:11

## 2017-01-21 RX ADMIN — DIBASIC SODIUM PHOSPHATE, MONOBASIC POTASSIUM PHOSPHATE AND MONOBASIC SODIUM PHOSPHATE 1 TABLET: 852; 155; 130 TABLET ORAL at 08:29

## 2017-01-21 RX ADMIN — DOCUSATE SODIUM 100 MG: 100 CAPSULE, LIQUID FILLED ORAL at 08:29

## 2017-01-21 RX ADMIN — PROMETHAZINE HYDROCHLORIDE 25 MG: 25 INJECTION INTRAMUSCULAR; INTRAVENOUS at 21:12

## 2017-01-21 RX ADMIN — ACETAMINOPHEN 650 MG: 325 TABLET ORAL at 00:46

## 2017-01-21 RX ADMIN — FUROSEMIDE 20 MG: 10 INJECTION, SOLUTION INTRAMUSCULAR; INTRAVENOUS at 14:16

## 2017-01-21 RX ADMIN — ACETAMINOPHEN 650 MG: 325 TABLET ORAL at 15:47

## 2017-01-21 RX ADMIN — ACETAMINOPHEN 650 MG: 325 TABLET ORAL at 21:10

## 2017-01-21 RX ADMIN — TAMSULOSIN HYDROCHLORIDE 0.4 MG: 0.4 CAPSULE ORAL at 15:50

## 2017-01-21 RX ADMIN — MORPHINE SULFATE 2 MG: 2 INJECTION, SOLUTION INTRAMUSCULAR; INTRAVENOUS at 05:36

## 2017-01-21 RX ADMIN — ONDANSETRON 4 MG: 2 INJECTION INTRAMUSCULAR; INTRAVENOUS at 14:31

## 2017-01-21 RX ADMIN — ESCITALOPRAM OXALATE 10 MG: 10 TABLET ORAL at 15:50

## 2017-01-21 RX ADMIN — ONDANSETRON 4 MG: 2 INJECTION INTRAMUSCULAR; INTRAVENOUS at 01:04

## 2017-01-21 RX ADMIN — OXYCODONE HYDROCHLORIDE 5 MG: 5 TABLET ORAL at 03:48

## 2017-01-21 RX ADMIN — DOCUSATE SODIUM 100 MG: 100 CAPSULE, LIQUID FILLED ORAL at 15:50

## 2017-01-21 RX ADMIN — ONDANSETRON 4 MG: 2 INJECTION INTRAMUSCULAR; INTRAVENOUS at 08:33

## 2017-01-21 RX ADMIN — DIBASIC SODIUM PHOSPHATE, MONOBASIC POTASSIUM PHOSPHATE AND MONOBASIC SODIUM PHOSPHATE 1 TABLET: 852; 155; 130 TABLET ORAL at 15:50

## 2017-01-21 RX ADMIN — POLYETHYLENE GLYCOL 3350 17 G: 17 POWDER, FOR SOLUTION ORAL at 08:31

## 2017-01-21 RX ADMIN — SODIUM CHLORIDE 100 ML/HR: 0.9 INJECTION, SOLUTION INTRAVENOUS at 01:08

## 2017-01-21 RX ADMIN — ACETAMINOPHEN 650 MG: 325 TABLET ORAL at 05:31

## 2017-01-21 RX ADMIN — METOPROLOL TARTRATE 25 MG: 25 TABLET ORAL at 08:29

## 2017-01-22 ENCOUNTER — HOSPITAL ENCOUNTER (INPATIENT)
Facility: HOSPITAL | Age: 82
LOS: 3 days | Discharge: HOSPICE | DRG: 841 | End: 2017-01-25
Attending: INTERNAL MEDICINE | Admitting: INTERNAL MEDICINE
Payer: MEDICARE

## 2017-01-22 VITALS
SYSTOLIC BLOOD PRESSURE: 153 MMHG | HEART RATE: 84 BPM | WEIGHT: 156.31 LBS | OXYGEN SATURATION: 92 % | HEIGHT: 63 IN | TEMPERATURE: 97.6 F | DIASTOLIC BLOOD PRESSURE: 80 MMHG | BODY MASS INDEX: 27.7 KG/M2 | RESPIRATION RATE: 18 BRPM

## 2017-01-22 DIAGNOSIS — R29.898 WEAKNESS OF EXTREMITY: ICD-10-CM

## 2017-01-22 DIAGNOSIS — C83.11 MANTLE CELL LYMPHOMA OF LYMPH NODES OF HEAD (HCC): ICD-10-CM

## 2017-01-22 DIAGNOSIS — I48.91 ATRIAL FIBRILLATION (HCC): ICD-10-CM

## 2017-01-22 DIAGNOSIS — C83.10 MANTLE CELL LYMPHOMA, UNSPECIFIED BODY REGION (HCC): Primary | ICD-10-CM

## 2017-01-22 PROBLEM — R33.9 URINARY RETENTION: Status: ACTIVE | Noted: 2017-01-22

## 2017-01-22 PROBLEM — K59.00 CONSTIPATION: Status: ACTIVE | Noted: 2017-01-22

## 2017-01-22 PROBLEM — K59.00 CONSTIPATION: Status: RESOLVED | Noted: 2017-01-22 | Resolved: 2017-01-22

## 2017-01-22 PROCEDURE — 0T9B70Z DRAINAGE OF BLADDER WITH DRAINAGE DEVICE, VIA NATURAL OR ARTIFICIAL OPENING: ICD-10-PCS | Performed by: INTERNAL MEDICINE

## 2017-01-22 PROCEDURE — 85007 BL SMEAR W/DIFF WBC COUNT: CPT | Performed by: INTERNAL MEDICINE

## 2017-01-22 PROCEDURE — 85027 COMPLETE CBC AUTOMATED: CPT | Performed by: INTERNAL MEDICINE

## 2017-01-22 PROCEDURE — 85025 COMPLETE CBC W/AUTO DIFF WBC: CPT | Performed by: INTERNAL MEDICINE

## 2017-01-22 RX ORDER — MORPHINE SULFATE 2 MG/ML
2 INJECTION, SOLUTION INTRAMUSCULAR; INTRAVENOUS
Status: DISCONTINUED | OUTPATIENT
Start: 2017-01-22 | End: 2017-01-25

## 2017-01-22 RX ORDER — ONDANSETRON 2 MG/ML
4 INJECTION INTRAMUSCULAR; INTRAVENOUS EVERY 6 HOURS PRN
Status: CANCELLED | OUTPATIENT
Start: 2017-01-22

## 2017-01-22 RX ORDER — OXYCODONE HYDROCHLORIDE 5 MG/1
5 TABLET ORAL EVERY 4 HOURS PRN
Status: DISCONTINUED | OUTPATIENT
Start: 2017-01-22 | End: 2017-01-25 | Stop reason: HOSPADM

## 2017-01-22 RX ORDER — ACETAMINOPHEN 325 MG/1
650 TABLET ORAL EVERY 6 HOURS PRN
Status: CANCELLED | OUTPATIENT
Start: 2017-01-22

## 2017-01-22 RX ORDER — ACETAMINOPHEN 325 MG/1
650 TABLET ORAL EVERY 6 HOURS PRN
Status: DISCONTINUED | OUTPATIENT
Start: 2017-01-22 | End: 2017-01-25 | Stop reason: HOSPADM

## 2017-01-22 RX ORDER — ESCITALOPRAM OXALATE 10 MG/1
10 TABLET ORAL DAILY
Status: CANCELLED | OUTPATIENT
Start: 2017-01-22

## 2017-01-22 RX ORDER — TAMSULOSIN HYDROCHLORIDE 0.4 MG/1
0.4 CAPSULE ORAL
Status: DISCONTINUED | OUTPATIENT
Start: 2017-01-22 | End: 2017-01-24

## 2017-01-22 RX ORDER — ESCITALOPRAM OXALATE 10 MG/1
10 TABLET ORAL DAILY
Status: DISCONTINUED | OUTPATIENT
Start: 2017-01-22 | End: 2017-01-25 | Stop reason: HOSPADM

## 2017-01-22 RX ORDER — TAMSULOSIN HYDROCHLORIDE 0.4 MG/1
0.4 CAPSULE ORAL
Status: CANCELLED | OUTPATIENT
Start: 2017-01-22

## 2017-01-22 RX ORDER — OXYCODONE HYDROCHLORIDE 5 MG/1
5 TABLET ORAL EVERY 4 HOURS PRN
Status: CANCELLED | OUTPATIENT
Start: 2017-01-22

## 2017-01-22 RX ORDER — ONDANSETRON 2 MG/ML
4 INJECTION INTRAMUSCULAR; INTRAVENOUS ONCE AS NEEDED
Status: COMPLETED | OUTPATIENT
Start: 2017-01-22 | End: 2017-01-23

## 2017-01-22 RX ORDER — BISACODYL 10 MG
10 SUPPOSITORY, RECTAL RECTAL DAILY PRN
Status: CANCELLED | OUTPATIENT
Start: 2017-01-22

## 2017-01-22 RX ORDER — POLYETHYLENE GLYCOL 3350 17 G/17G
17 POWDER, FOR SOLUTION ORAL DAILY
Status: DISCONTINUED | OUTPATIENT
Start: 2017-01-23 | End: 2017-01-23

## 2017-01-22 RX ORDER — ONDANSETRON 2 MG/ML
4 INJECTION INTRAMUSCULAR; INTRAVENOUS ONCE AS NEEDED
Status: CANCELLED | OUTPATIENT
Start: 2017-01-22

## 2017-01-22 RX ORDER — SENNOSIDES 8.6 MG
2 TABLET ORAL
Status: CANCELLED | OUTPATIENT
Start: 2017-01-22

## 2017-01-22 RX ORDER — PROMETHAZINE HYDROCHLORIDE 25 MG/ML
25 INJECTION, SOLUTION INTRAMUSCULAR; INTRAVENOUS EVERY 4 HOURS PRN
Status: DISCONTINUED | OUTPATIENT
Start: 2017-01-22 | End: 2017-01-25 | Stop reason: HOSPADM

## 2017-01-22 RX ORDER — LACTULOSE 20 G/30ML
20 SOLUTION ORAL DAILY
Status: CANCELLED | OUTPATIENT
Start: 2017-01-22

## 2017-01-22 RX ORDER — DOCUSATE SODIUM 100 MG/1
100 CAPSULE, LIQUID FILLED ORAL 2 TIMES DAILY
Status: CANCELLED | OUTPATIENT
Start: 2017-01-22

## 2017-01-22 RX ORDER — ONDANSETRON 2 MG/ML
4 INJECTION INTRAMUSCULAR; INTRAVENOUS EVERY 6 HOURS PRN
Status: DISCONTINUED | OUTPATIENT
Start: 2017-01-22 | End: 2017-01-25 | Stop reason: HOSPADM

## 2017-01-22 RX ORDER — LACTULOSE 20 G/30ML
20 SOLUTION ORAL DAILY
Status: DISCONTINUED | OUTPATIENT
Start: 2017-01-22 | End: 2017-01-22 | Stop reason: HOSPADM

## 2017-01-22 RX ORDER — LACTULOSE 20 G/30ML
20 SOLUTION ORAL DAILY
Status: DISCONTINUED | OUTPATIENT
Start: 2017-01-23 | End: 2017-01-23

## 2017-01-22 RX ORDER — MORPHINE SULFATE 2 MG/ML
2 INJECTION, SOLUTION INTRAMUSCULAR; INTRAVENOUS
Status: CANCELLED | OUTPATIENT
Start: 2017-01-22

## 2017-01-22 RX ORDER — SENNOSIDES 8.6 MG
2 TABLET ORAL
Status: DISCONTINUED | OUTPATIENT
Start: 2017-01-22 | End: 2017-01-25 | Stop reason: HOSPADM

## 2017-01-22 RX ORDER — PROMETHAZINE HYDROCHLORIDE 25 MG/ML
25 INJECTION, SOLUTION INTRAMUSCULAR; INTRAVENOUS EVERY 4 HOURS PRN
Status: CANCELLED | OUTPATIENT
Start: 2017-01-22

## 2017-01-22 RX ORDER — POLYETHYLENE GLYCOL 3350 17 G/17G
17 POWDER, FOR SOLUTION ORAL DAILY
Status: CANCELLED | OUTPATIENT
Start: 2017-01-23

## 2017-01-22 RX ORDER — DOCUSATE SODIUM 100 MG/1
100 CAPSULE, LIQUID FILLED ORAL 2 TIMES DAILY
Status: DISCONTINUED | OUTPATIENT
Start: 2017-01-22 | End: 2017-01-25 | Stop reason: HOSPADM

## 2017-01-22 RX ORDER — BISACODYL 10 MG
10 SUPPOSITORY, RECTAL RECTAL DAILY PRN
Status: DISCONTINUED | OUTPATIENT
Start: 2017-01-22 | End: 2017-01-23

## 2017-01-22 RX ADMIN — ONDANSETRON 4 MG: 2 INJECTION INTRAMUSCULAR; INTRAVENOUS at 13:22

## 2017-01-22 RX ADMIN — MORPHINE SULFATE 2 MG: 2 INJECTION, SOLUTION INTRAMUSCULAR; INTRAVENOUS at 01:01

## 2017-01-22 RX ADMIN — MORPHINE SULFATE 2 MG: 2 INJECTION, SOLUTION INTRAMUSCULAR; INTRAVENOUS at 04:53

## 2017-01-22 RX ADMIN — POLYETHYLENE GLYCOL 3350 17 G: 17 POWDER, FOR SOLUTION ORAL at 08:05

## 2017-01-22 RX ADMIN — SENNOSIDES 17.2 MG: 8.6 TABLET, FILM COATED ORAL at 21:23

## 2017-01-22 RX ADMIN — ACETAMINOPHEN 650 MG: 325 TABLET ORAL at 04:46

## 2017-01-22 RX ADMIN — DOCUSATE SODIUM 100 MG: 100 CAPSULE, LIQUID FILLED ORAL at 08:05

## 2017-01-22 RX ADMIN — ACETAMINOPHEN 650 MG: 325 TABLET ORAL at 16:00

## 2017-01-22 RX ADMIN — ESCITALOPRAM OXALATE 10 MG: 10 TABLET, FILM COATED ORAL at 16:00

## 2017-01-22 RX ADMIN — DIBASIC SODIUM PHOSPHATE, MONOBASIC POTASSIUM PHOSPHATE AND MONOBASIC SODIUM PHOSPHATE 1 TABLET: 852; 155; 130 TABLET ORAL at 08:04

## 2017-01-22 RX ADMIN — DOCUSATE SODIUM 100 MG: 100 CAPSULE, LIQUID FILLED ORAL at 18:06

## 2017-01-22 RX ADMIN — ACETAMINOPHEN 650 MG: 325 TABLET ORAL at 21:23

## 2017-01-22 RX ADMIN — TAMSULOSIN HYDROCHLORIDE 0.4 MG: 0.4 CAPSULE ORAL at 16:00

## 2017-01-22 RX ADMIN — ONDANSETRON 4 MG: 2 INJECTION INTRAMUSCULAR; INTRAVENOUS at 04:47

## 2017-01-22 RX ADMIN — LACTULOSE 20 G: 10 SOLUTION ORAL at 10:43

## 2017-01-22 RX ADMIN — BISACODYL 10 MG: 10 SUPPOSITORY RECTAL at 01:21

## 2017-01-22 RX ADMIN — DIBASIC SODIUM PHOSPHATE, MONOBASIC POTASSIUM PHOSPHATE AND MONOBASIC SODIUM PHOSPHATE 1 TABLET: 852; 155; 130 TABLET ORAL at 16:00

## 2017-01-22 RX ADMIN — METOPROLOL TARTRATE 25 MG: 25 TABLET ORAL at 08:04

## 2017-01-23 ENCOUNTER — APPOINTMENT (INPATIENT)
Dept: MRI IMAGING | Facility: HOSPITAL | Age: 82
DRG: 841 | End: 2017-01-23
Payer: MEDICARE

## 2017-01-23 ENCOUNTER — APPOINTMENT (INPATIENT)
Dept: CT IMAGING | Facility: HOSPITAL | Age: 82
DRG: 841 | End: 2017-01-23
Payer: MEDICARE

## 2017-01-23 ENCOUNTER — HOSPITAL ENCOUNTER (OUTPATIENT)
Dept: INFUSION CENTER | Facility: CLINIC | Age: 82
Discharge: HOME | End: 2017-01-23
Payer: MEDICARE

## 2017-01-23 LAB
ANION GAP SERPL CALCULATED.3IONS-SCNC: 12 MMOL/L (ref 4–13)
ANISOCYTOSIS BLD QL SMEAR: PRESENT
ATRIAL RATE: 106 BPM
ATRIAL RATE: 106 BPM
BASOPHILS # BLD MANUAL: 0 THOUSAND/UL (ref 0–0.1)
BASOPHILS NFR MAR MANUAL: 0 % (ref 0–1)
BLASTS NFR BLD MANUAL: 9 %
BUN SERPL-MCNC: 14 MG/DL (ref 5–25)
CALCIUM SERPL-MCNC: 9 MG/DL (ref 8.3–10.1)
CHLORIDE SERPL-SCNC: 101 MMOL/L (ref 100–108)
CO2 SERPL-SCNC: 27 MMOL/L (ref 21–32)
CREAT SERPL-MCNC: 0.91 MG/DL (ref 0.6–1.3)
EOSINOPHIL # BLD MANUAL: 2.51 THOUSAND/UL (ref 0–0.4)
EOSINOPHIL NFR BLD MANUAL: 1 % (ref 0–6)
ERYTHROCYTE [DISTWIDTH] IN BLOOD BY AUTOMATED COUNT: 20.9 % (ref 11.6–15.1)
GFR SERPL CREATININE-BSD FRML MDRD: >60 ML/MIN/1.73SQ M
GLUCOSE SERPL-MCNC: 80 MG/DL (ref 65–140)
HCT VFR BLD AUTO: 31.3 % (ref 36.5–49.3)
HGB BLD-MCNC: 10.2 G/DL (ref 12–17)
LYMPHOCYTES # BLD AUTO: 110.38 THOUSAND/UL (ref 0.6–4.47)
LYMPHOCYTES # BLD AUTO: 44 % (ref 14–44)
MCH RBC QN AUTO: 31.3 PG (ref 26.8–34.3)
MCHC RBC AUTO-ENTMCNC: 32.6 G/DL (ref 31.4–37.4)
MCV RBC AUTO: 96 FL (ref 82–98)
MONOCYTES # BLD AUTO: 60.21 THOUSAND/UL (ref 0–1.22)
MONOCYTES NFR BLD: 24 % (ref 4–12)
NEUTROPHILS # BLD MANUAL: 10.03 THOUSAND/UL (ref 1.85–7.62)
NEUTS BAND NFR BLD MANUAL: 2 % (ref 0–8)
NEUTS SEG NFR BLD AUTO: 2 % (ref 43–75)
NRBC BLD AUTO-RTO: 1 /100 WBC (ref 0–2)
P AXIS: 54 DEGREES
P AXIS: 58 DEGREES
PLATELET # BLD AUTO: 64 THOUSANDS/UL (ref 149–390)
PLATELET BLD QL SMEAR: ABNORMAL
PMV BLD AUTO: 9.1 FL (ref 8.9–12.7)
POTASSIUM SERPL-SCNC: 2.7 MMOL/L (ref 3.5–5.3)
PR INTERVAL: 128 MS
PR INTERVAL: 132 MS
QRS AXIS: -18 DEGREES
QRS AXIS: -4 DEGREES
QRSD INTERVAL: 72 MS
QRSD INTERVAL: 76 MS
QT INTERVAL: 336 MS
QT INTERVAL: 346 MS
QTC INTERVAL: 446 MS
QTC INTERVAL: 459 MS
RBC # BLD AUTO: 3.26 MILLION/UL (ref 3.88–5.62)
SMUDGE CELLS BLD QL SMEAR: PRESENT
SODIUM SERPL-SCNC: 140 MMOL/L (ref 136–145)
T WAVE AXIS: 13 DEGREES
T WAVE AXIS: 2 DEGREES
TOTAL CELLS COUNTED SPEC: 100
TSH SERPL DL<=0.05 MIU/L-ACNC: 1.91 UIU/ML (ref 0.36–3.74)
UNIDENT CELLS # BLD: 18 % (ref 0–0)
URATE SERPL-MCNC: 10.9 MG/DL (ref 4.2–8)
VENTRICULAR RATE: 106 BPM
VENTRICULAR RATE: 106 BPM
WBC # BLD AUTO: 250.86 THOUSAND/UL (ref 4.31–10.16)

## 2017-01-23 PROCEDURE — 70553 MRI BRAIN STEM W/O & W/DYE: CPT

## 2017-01-23 PROCEDURE — 84443 ASSAY THYROID STIM HORMONE: CPT | Performed by: PHYSICIAN ASSISTANT

## 2017-01-23 PROCEDURE — 72156 MRI NECK SPINE W/O & W/DYE: CPT

## 2017-01-23 PROCEDURE — G8978 MOBILITY CURRENT STATUS: HCPCS | Performed by: PHYSICAL THERAPIST

## 2017-01-23 PROCEDURE — 85027 COMPLETE CBC AUTOMATED: CPT | Performed by: INTERNAL MEDICINE

## 2017-01-23 PROCEDURE — 80048 BASIC METABOLIC PNL TOTAL CA: CPT | Performed by: INTERNAL MEDICINE

## 2017-01-23 PROCEDURE — A9585 GADOBUTROL INJECTION: HCPCS | Performed by: INTERNAL MEDICINE

## 2017-01-23 PROCEDURE — 85007 BL SMEAR W/DIFF WBC COUNT: CPT | Performed by: INTERNAL MEDICINE

## 2017-01-23 PROCEDURE — G8979 MOBILITY GOAL STATUS: HCPCS | Performed by: PHYSICAL THERAPIST

## 2017-01-23 PROCEDURE — 84550 ASSAY OF BLOOD/URIC ACID: CPT | Performed by: PHYSICIAN ASSISTANT

## 2017-01-23 PROCEDURE — 97163 PT EVAL HIGH COMPLEX 45 MIN: CPT | Performed by: PHYSICAL THERAPIST

## 2017-01-23 PROCEDURE — 93005 ELECTROCARDIOGRAM TRACING: CPT | Performed by: INTERNAL MEDICINE

## 2017-01-23 PROCEDURE — 94150 VITAL CAPACITY TEST: CPT

## 2017-01-23 RX ORDER — SODIUM CHLORIDE 9 MG/ML
75 INJECTION, SOLUTION INTRAVENOUS CONTINUOUS
Status: DISCONTINUED | OUTPATIENT
Start: 2017-01-23 | End: 2017-01-24

## 2017-01-23 RX ORDER — SODIUM CHLORIDE 9 MG/ML
20 INJECTION, SOLUTION INTRAVENOUS CONTINUOUS
Status: DISCONTINUED | OUTPATIENT
Start: 2017-01-23 | End: 2017-01-23

## 2017-01-23 RX ORDER — METOPROLOL TARTRATE 50 MG/1
50 TABLET, FILM COATED ORAL DAILY
Status: DISCONTINUED | OUTPATIENT
Start: 2017-01-24 | End: 2017-01-24

## 2017-01-23 RX ORDER — POLYETHYLENE GLYCOL 3350 17 G/17G
17 POWDER, FOR SOLUTION ORAL DAILY PRN
Status: DISCONTINUED | OUTPATIENT
Start: 2017-01-23 | End: 2017-01-25 | Stop reason: HOSPADM

## 2017-01-23 RX ORDER — POTASSIUM CHLORIDE 20 MEQ/1
40 TABLET, EXTENDED RELEASE ORAL 2 TIMES DAILY
Status: DISCONTINUED | OUTPATIENT
Start: 2017-01-23 | End: 2017-01-24

## 2017-01-23 RX ORDER — POTASSIUM CHLORIDE 20MEQ/15ML
40 LIQUID (ML) ORAL ONCE
Status: DISCONTINUED | OUTPATIENT
Start: 2017-01-23 | End: 2017-01-23

## 2017-01-23 RX ORDER — METHYLPREDNISOLONE SODIUM SUCCINATE 125 MG/2ML
125 INJECTION, POWDER, LYOPHILIZED, FOR SOLUTION INTRAMUSCULAR; INTRAVENOUS DAILY
Status: DISCONTINUED | OUTPATIENT
Start: 2017-01-23 | End: 2017-01-25 | Stop reason: HOSPADM

## 2017-01-23 RX ADMIN — METHYLPREDNISOLONE SODIUM SUCCINATE 125 MG: 125 INJECTION, POWDER, FOR SOLUTION INTRAMUSCULAR; INTRAVENOUS at 13:11

## 2017-01-23 RX ADMIN — POTASSIUM CHLORIDE 40 MEQ: 1500 TABLET, EXTENDED RELEASE ORAL at 18:13

## 2017-01-23 RX ADMIN — DIBASIC SODIUM PHOSPHATE, MONOBASIC POTASSIUM PHOSPHATE AND MONOBASIC SODIUM PHOSPHATE 1 TABLET: 852; 155; 130 TABLET ORAL at 08:10

## 2017-01-23 RX ADMIN — METOPROLOL TARTRATE 5 MG: 5 INJECTION INTRAVENOUS at 11:19

## 2017-01-23 RX ADMIN — METOPROLOL TARTRATE 25 MG: 25 TABLET ORAL at 08:08

## 2017-01-23 RX ADMIN — POTASSIUM CHLORIDE 40 MEQ: 1500 TABLET, EXTENDED RELEASE ORAL at 10:18

## 2017-01-23 RX ADMIN — GADOBUTROL 7 ML: 604.72 INJECTION INTRAVENOUS at 17:57

## 2017-01-23 RX ADMIN — LACTULOSE 20 G: 10 SOLUTION ORAL at 08:08

## 2017-01-23 RX ADMIN — MORPHINE SULFATE 2 MG: 2 INJECTION, SOLUTION INTRAMUSCULAR; INTRAVENOUS at 00:38

## 2017-01-23 RX ADMIN — SODIUM CHLORIDE 75 ML/HR: 0.9 INJECTION, SOLUTION INTRAVENOUS at 22:39

## 2017-01-23 RX ADMIN — DOCUSATE SODIUM 100 MG: 100 CAPSULE, LIQUID FILLED ORAL at 08:08

## 2017-01-23 RX ADMIN — TAMSULOSIN HYDROCHLORIDE 0.4 MG: 0.4 CAPSULE ORAL at 15:30

## 2017-01-23 RX ADMIN — MORPHINE SULFATE 2 MG: 2 INJECTION, SOLUTION INTRAMUSCULAR; INTRAVENOUS at 04:29

## 2017-01-23 RX ADMIN — ESCITALOPRAM OXALATE 10 MG: 10 TABLET, FILM COATED ORAL at 15:30

## 2017-01-23 RX ADMIN — DOCUSATE SODIUM 100 MG: 100 CAPSULE, LIQUID FILLED ORAL at 18:12

## 2017-01-23 RX ADMIN — SODIUM CHLORIDE 75 ML/HR: 0.9 INJECTION, SOLUTION INTRAVENOUS at 10:18

## 2017-01-23 RX ADMIN — SENNOSIDES 17.2 MG: 8.6 TABLET, FILM COATED ORAL at 21:10

## 2017-01-23 RX ADMIN — ONDANSETRON 4 MG: 2 INJECTION INTRAMUSCULAR; INTRAVENOUS at 04:29

## 2017-01-24 ENCOUNTER — ALLSCRIPTS OFFICE VISIT (OUTPATIENT)
Dept: OTHER | Facility: OTHER | Age: 82
End: 2017-01-24

## 2017-01-24 VITALS
TEMPERATURE: 97.5 F | WEIGHT: 155.42 LBS | HEIGHT: 63 IN | SYSTOLIC BLOOD PRESSURE: 146 MMHG | OXYGEN SATURATION: 93 % | BODY MASS INDEX: 27.54 KG/M2 | DIASTOLIC BLOOD PRESSURE: 74 MMHG | RESPIRATION RATE: 18 BRPM | HEART RATE: 90 BPM

## 2017-01-24 DIAGNOSIS — C83.10 MALIGNANT LYMPHOMA, CENTROCYTIC (HCC): Primary | ICD-10-CM

## 2017-01-24 LAB
ALBUMIN SERPL BCP-MCNC: 2.5 G/DL (ref 3.5–5)
ALP SERPL-CCNC: 167 U/L (ref 46–116)
ALT SERPL W P-5'-P-CCNC: 18 U/L (ref 12–78)
ANION GAP SERPL CALCULATED.3IONS-SCNC: 10 MMOL/L (ref 4–13)
ANISOCYTOSIS BLD QL SMEAR: PRESENT
ANISOCYTOSIS BLD QL SMEAR: PRESENT
AST SERPL W P-5'-P-CCNC: 69 U/L (ref 5–45)
BACTERIA UR QL AUTO: ABNORMAL /HPF
BASOPHILS # BLD MANUAL: 0 THOUSAND/UL (ref 0–0.1)
BASOPHILS # BLD MANUAL: 0 THOUSAND/UL (ref 0–0.1)
BASOPHILS NFR MAR MANUAL: 0 % (ref 0–1)
BASOPHILS NFR MAR MANUAL: 0 % (ref 0–1)
BILIRUB SERPL-MCNC: 0.5 MG/DL (ref 0.2–1)
BILIRUB UR QL STRIP: NEGATIVE
BLASTS NFR BLD MANUAL: 10 %
BLASTS NFR BLD MANUAL: 24 %
BUN SERPL-MCNC: 21 MG/DL (ref 5–25)
CALCIUM SERPL-MCNC: 8.8 MG/DL (ref 8.3–10.1)
CHLORIDE SERPL-SCNC: 103 MMOL/L (ref 100–108)
CLARITY UR: ABNORMAL
CO2 SERPL-SCNC: 26 MMOL/L (ref 21–32)
COLOR UR: YELLOW
CREAT SERPL-MCNC: 0.85 MG/DL (ref 0.6–1.3)
EOSINOPHIL # BLD MANUAL: 0 THOUSAND/UL (ref 0–0.4)
EOSINOPHIL # BLD MANUAL: 0 THOUSAND/UL (ref 0–0.4)
EOSINOPHIL NFR BLD MANUAL: 0 % (ref 0–6)
EOSINOPHIL NFR BLD MANUAL: 0 % (ref 0–6)
ERYTHROCYTE [DISTWIDTH] IN BLOOD BY AUTOMATED COUNT: 20.2 % (ref 11.6–15.1)
ERYTHROCYTE [DISTWIDTH] IN BLOOD BY AUTOMATED COUNT: 20.7 % (ref 11.6–15.1)
FOLATE SERPL-MCNC: >20 NG/ML (ref 3.1–17.5)
GFR SERPL CREATININE-BSD FRML MDRD: >60 ML/MIN/1.73SQ M
GLUCOSE SERPL-MCNC: 101 MG/DL (ref 65–140)
GLUCOSE UR STRIP-MCNC: NEGATIVE MG/DL
HCT VFR BLD AUTO: 30.1 % (ref 36.5–49.3)
HCT VFR BLD AUTO: 30.4 % (ref 36.5–49.3)
HGB BLD-MCNC: 9.6 G/DL (ref 12–17)
HGB BLD-MCNC: 9.7 G/DL (ref 12–17)
HGB UR QL STRIP.AUTO: ABNORMAL
KETONES UR STRIP-MCNC: ABNORMAL MG/DL
LDH SERPL-CCNC: 2097 U/L (ref 81–234)
LEUKOCYTE ESTERASE UR QL STRIP: NEGATIVE
LYMPHOCYTES # BLD AUTO: 122.58 THOUSAND/UL (ref 0.6–4.47)
LYMPHOCYTES # BLD AUTO: 35 % (ref 14–44)
LYMPHOCYTES # BLD AUTO: 45 % (ref 14–44)
LYMPHOCYTES # BLD AUTO: 74.55 THOUSAND/UL (ref 0.6–4.47)
MCH RBC QN AUTO: 29.8 PG (ref 26.8–34.3)
MCH RBC QN AUTO: 30.4 PG (ref 26.8–34.3)
MCHC RBC AUTO-ENTMCNC: 31.9 G/DL (ref 31.4–37.4)
MCHC RBC AUTO-ENTMCNC: 31.9 G/DL (ref 31.4–37.4)
MCV RBC AUTO: 93 FL (ref 82–98)
MCV RBC AUTO: 95 FL (ref 82–98)
METAMYELOCYTES NFR BLD MANUAL: 3 % (ref 0–1)
MONOCYTES # BLD AUTO: 40.47 THOUSAND/UL (ref 0–1.22)
MONOCYTES # BLD AUTO: 8.17 THOUSAND/UL (ref 0–1.22)
MONOCYTES NFR BLD: 19 % (ref 4–12)
MONOCYTES NFR BLD: 3 % (ref 4–12)
MYELOCYTES NFR BLD MANUAL: 1 % (ref 0–1)
MYELOCYTES NFR BLD MANUAL: 1 % (ref 0–1)
NEUTROPHILS # BLD MANUAL: 10.65 THOUSAND/UL (ref 1.85–7.62)
NEUTROPHILS # BLD MANUAL: 10.9 THOUSAND/UL (ref 1.85–7.62)
NEUTS BAND NFR BLD MANUAL: 1 % (ref 0–8)
NEUTS SEG NFR BLD AUTO: 4 % (ref 43–75)
NEUTS SEG NFR BLD AUTO: 4 % (ref 43–75)
NITRITE UR QL STRIP: NEGATIVE
NON-SQ EPI CELLS URNS QL MICRO: ABNORMAL /HPF
NRBC BLD AUTO-RTO: 0 /100 WBCS
PATHOLOGY REVIEW: YES
PH UR STRIP.AUTO: 6.5 [PH] (ref 4.5–8)
PLATELET # BLD AUTO: 60 THOUSANDS/UL (ref 149–390)
PLATELET # BLD AUTO: 60 THOUSANDS/UL (ref 149–390)
PLATELET BLD QL SMEAR: ABNORMAL
PLATELET BLD QL SMEAR: ABNORMAL
PMV BLD AUTO: 10.1 FL (ref 8.9–12.7)
PMV BLD AUTO: 8.7 FL (ref 8.9–12.7)
POTASSIUM SERPL-SCNC: 4.1 MMOL/L (ref 3.5–5.3)
PROT SERPL-MCNC: 5.9 G/DL (ref 6.4–8.2)
PROT UR STRIP-MCNC: ABNORMAL MG/DL
RBC # BLD AUTO: 3.16 MILLION/UL (ref 3.88–5.62)
RBC # BLD AUTO: 3.26 MILLION/UL (ref 3.88–5.62)
RBC #/AREA URNS AUTO: ABNORMAL /HPF
SMUDGE CELLS BLD QL SMEAR: PRESENT
SMUDGE CELLS BLD QL SMEAR: PRESENT
SODIUM SERPL-SCNC: 139 MMOL/L (ref 136–145)
SP GR UR STRIP.AUTO: 1.02 (ref 1–1.03)
TOTAL CELLS COUNTED SPEC: 100
TOTAL CELLS COUNTED SPEC: 100
UNIDENT CELLS # BLD: 10 % (ref 0–0)
UNIDENT CELLS # BLD: 20 % (ref 0–0)
URATE SERPL-MCNC: 11.5 MG/DL (ref 4.2–8)
UROBILINOGEN UR QL STRIP.AUTO: 0.2 E.U./DL
VARIANT LYMPHS # BLD AUTO: 13 %
VARIANT LYMPHS # BLD AUTO: 7 %
VIT B12 SERPL-MCNC: 2166 PG/ML (ref 100–900)
WBC # BLD AUTO: 213.01 THOUSAND/UL (ref 4.31–10.16)
WBC # BLD AUTO: 272.41 THOUSAND/UL (ref 4.31–10.16)
WBC #/AREA URNS AUTO: ABNORMAL /HPF

## 2017-01-24 PROCEDURE — 82746 ASSAY OF FOLIC ACID SERUM: CPT | Performed by: NURSE PRACTITIONER

## 2017-01-24 PROCEDURE — 85007 BL SMEAR W/DIFF WBC COUNT: CPT | Performed by: INTERNAL MEDICINE

## 2017-01-24 PROCEDURE — 83615 LACTATE (LD) (LDH) ENZYME: CPT | Performed by: INTERNAL MEDICINE

## 2017-01-24 PROCEDURE — 84166 PROTEIN E-PHORESIS/URINE/CSF: CPT | Performed by: NURSE PRACTITIONER

## 2017-01-24 PROCEDURE — 81001 URINALYSIS AUTO W/SCOPE: CPT | Performed by: INTERNAL MEDICINE

## 2017-01-24 PROCEDURE — 82607 VITAMIN B-12: CPT | Performed by: NURSE PRACTITIONER

## 2017-01-24 PROCEDURE — 84550 ASSAY OF BLOOD/URIC ACID: CPT | Performed by: INTERNAL MEDICINE

## 2017-01-24 PROCEDURE — 86335 IMMUNFIX E-PHORSIS/URINE/CSF: CPT | Performed by: INTERNAL MEDICINE

## 2017-01-24 PROCEDURE — 80053 COMPREHEN METABOLIC PANEL: CPT | Performed by: INTERNAL MEDICINE

## 2017-01-24 PROCEDURE — 84165 PROTEIN E-PHORESIS SERUM: CPT | Performed by: NURSE PRACTITIONER

## 2017-01-24 PROCEDURE — 85027 COMPLETE CBC AUTOMATED: CPT | Performed by: INTERNAL MEDICINE

## 2017-01-24 PROCEDURE — 87086 URINE CULTURE/COLONY COUNT: CPT | Performed by: INTERNAL MEDICINE

## 2017-01-24 RX ORDER — METOPROLOL TARTRATE 50 MG/1
50 TABLET, FILM COATED ORAL DAILY
Status: DISCONTINUED | OUTPATIENT
Start: 2017-01-24 | End: 2017-01-25 | Stop reason: HOSPADM

## 2017-01-24 RX ORDER — LORAZEPAM 2 MG/ML
0.5 INJECTION INTRAMUSCULAR EVERY 4 HOURS PRN
Status: DISCONTINUED | OUTPATIENT
Start: 2017-01-24 | End: 2017-01-25 | Stop reason: HOSPADM

## 2017-01-24 RX ORDER — LORAZEPAM 2 MG/ML
0.5 INJECTION INTRAMUSCULAR EVERY 6 HOURS PRN
Status: DISCONTINUED | OUTPATIENT
Start: 2017-01-24 | End: 2017-01-24

## 2017-01-24 RX ADMIN — METHYLPREDNISOLONE SODIUM SUCCINATE 125 MG: 125 INJECTION, POWDER, FOR SOLUTION INTRAMUSCULAR; INTRAVENOUS at 08:30

## 2017-01-24 RX ADMIN — ONDANSETRON 4 MG: 2 INJECTION INTRAMUSCULAR; INTRAVENOUS at 23:42

## 2017-01-24 RX ADMIN — SENNOSIDES 17.2 MG: 8.6 TABLET, FILM COATED ORAL at 21:42

## 2017-01-24 RX ADMIN — ACETAMINOPHEN 650 MG: 325 TABLET ORAL at 19:25

## 2017-01-24 RX ADMIN — ACETAMINOPHEN 650 MG: 325 TABLET ORAL at 09:35

## 2017-01-24 RX ADMIN — ESCITALOPRAM OXALATE 10 MG: 10 TABLET, FILM COATED ORAL at 18:03

## 2017-01-24 RX ADMIN — ACETAMINOPHEN 650 MG: 325 TABLET ORAL at 00:06

## 2017-01-24 RX ADMIN — DOCUSATE SODIUM 100 MG: 100 CAPSULE, LIQUID FILLED ORAL at 08:30

## 2017-01-24 RX ADMIN — POTASSIUM CHLORIDE 40 MEQ: 1500 TABLET, EXTENDED RELEASE ORAL at 08:30

## 2017-01-24 RX ADMIN — METOPROLOL TARTRATE 5 MG: 5 INJECTION INTRAVENOUS at 01:47

## 2017-01-24 RX ADMIN — METOPROLOL TARTRATE 50 MG: 50 TABLET ORAL at 04:44

## 2017-01-24 RX ADMIN — METOPROLOL TARTRATE 5 MG: 5 INJECTION INTRAVENOUS at 04:40

## 2017-01-25 LAB
BACTERIA BLD CULT: NORMAL
BACTERIA UR CULT: NORMAL
PATHOLOGIST INTERPRETATION: NORMAL

## 2017-01-25 RX ORDER — LORAZEPAM 2 MG/ML
1 CONCENTRATE ORAL EVERY 4 HOURS PRN
Qty: 30 ML | Refills: 0 | Status: SHIPPED | OUTPATIENT
Start: 2017-01-25 | End: 2017-02-04

## 2017-01-25 RX ORDER — MORPHINE SULFATE 100 MG/5ML
5 SOLUTION ORAL EVERY 2 HOUR PRN
Qty: 30 ML | Refills: 0 | Status: SHIPPED | OUTPATIENT
Start: 2017-01-25 | End: 2017-02-04

## 2017-01-25 RX ORDER — MORPHINE SULFATE 2 MG/ML
2 INJECTION, SOLUTION INTRAMUSCULAR; INTRAVENOUS
Status: DISCONTINUED | OUTPATIENT
Start: 2017-01-25 | End: 2017-01-25 | Stop reason: HOSPADM

## 2017-01-25 RX ADMIN — MORPHINE SULFATE 2 MG: 2 INJECTION, SOLUTION INTRAMUSCULAR; INTRAVENOUS at 09:34

## 2017-01-25 RX ADMIN — METHYLPREDNISOLONE SODIUM SUCCINATE 125 MG: 125 INJECTION, POWDER, FOR SOLUTION INTRAMUSCULAR; INTRAVENOUS at 08:36

## 2017-01-25 RX ADMIN — LORAZEPAM 0.5 MG: 2 INJECTION INTRAMUSCULAR; INTRAVENOUS at 02:27

## 2017-01-25 RX ADMIN — LORAZEPAM 0.5 MG: 2 INJECTION INTRAMUSCULAR; INTRAVENOUS at 10:58

## 2017-01-25 RX ADMIN — MORPHINE SULFATE 2 MG: 2 INJECTION, SOLUTION INTRAMUSCULAR; INTRAVENOUS at 10:58

## 2017-01-25 RX ADMIN — MORPHINE SULFATE 2 MG: 2 INJECTION, SOLUTION INTRAMUSCULAR; INTRAVENOUS at 06:25

## 2017-01-25 RX ADMIN — MORPHINE SULFATE 2 MG: 2 INJECTION, SOLUTION INTRAMUSCULAR; INTRAVENOUS at 08:36

## 2017-01-25 RX ADMIN — MORPHINE SULFATE 2 MG: 2 INJECTION, SOLUTION INTRAMUSCULAR; INTRAVENOUS at 04:28

## 2017-01-25 RX ADMIN — MORPHINE SULFATE 2 MG: 2 INJECTION, SOLUTION INTRAMUSCULAR; INTRAVENOUS at 00:47

## 2017-01-26 DIAGNOSIS — I82.409 ACUTE EMBOLISM AND THROMBOSIS OF DEEP VEIN OF LOWER EXTREMITY (HCC): ICD-10-CM

## 2017-01-26 DIAGNOSIS — D64.9 ANEMIA: ICD-10-CM

## 2017-01-26 DIAGNOSIS — C83.10 MANTLE CELL LYMPHOMA (HCC): ICD-10-CM

## 2017-01-26 LAB
ALBUMIN SERPL ELPH-MCNC: 3.05 G/DL (ref 3.5–5)
ALBUMIN SERPL ELPH-MCNC: 54.4 % (ref 52–65)
ALBUMIN UR ELPH-MCNC: 27.7 %
ALPHA1 GLOB MFR UR ELPH: 17.3 %
ALPHA1 GLOB SERPL ELPH-MCNC: 0.48 G/DL (ref 0.1–0.4)
ALPHA1 GLOB SERPL ELPH-MCNC: 8.6 % (ref 2.5–5)
ALPHA2 GLOB MFR UR ELPH: 14.6 %
ALPHA2 GLOB SERPL ELPH-MCNC: 0.97 G/DL (ref 0.4–1.2)
ALPHA2 GLOB SERPL ELPH-MCNC: 17.4 % (ref 7–13)
B-GLOBULIN MFR UR ELPH: 8.7 %
BACTERIA BLD CULT: NORMAL
BETA GLOB ABNORMAL SERPL ELPH-MCNC: 0.25 G/DL (ref 0.4–0.8)
BETA1 GLOB SERPL ELPH-MCNC: 4.5 % (ref 5–13)
BETA2 GLOB SERPL ELPH-MCNC: 6.2 % (ref 2–8)
BETA2+GAMMA GLOB SERPL ELPH-MCNC: 0.35 G/DL (ref 0.2–0.5)
GAMMA GLOB ABNORMAL SERPL ELPH-MCNC: 0.5 G/DL (ref 0.5–1.6)
GAMMA GLOB MFR UR ELPH: 31.7 %
GAMMA GLOB SERPL ELPH-MCNC: 8.9 % (ref 12–22)
IGG/ALB SER: 1.19 {RATIO} (ref 1.1–1.8)
INTERPRETATION UR IFE-IMP: NORMAL
M PROTEIN MFR UR ELPH: 15.74 MG/DL
M PROTEIN UR-MCNC: 15.9 %
PROT PATTERN UR ELPH-IMP: ABNORMAL
PROT SERPL-MCNC: 5.6 G/DL (ref 6.4–8.2)
PROT UR-MCNC: 99 MG/DL

## 2017-01-27 ENCOUNTER — HOSPITAL ENCOUNTER (OUTPATIENT)
Dept: INFUSION CENTER | Facility: CLINIC | Age: 82
Discharge: HOME | End: 2017-01-27
Payer: MEDICARE

## 2017-02-27 DIAGNOSIS — D64.9 ANEMIA: ICD-10-CM

## 2017-03-22 ENCOUNTER — TRANSCRIBE ORDERS (OUTPATIENT)
Dept: LAB | Facility: OTHER | Age: 82
End: 2017-03-22

## 2018-01-11 NOTE — MISCELLANEOUS
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)   3  Abdominal pain, left upper quadrant (789 02) (R10 12)    Plan  Abdominal pain, left upper quadrant    · (1) URINALYSIS w URINE C/S REFLEX (will reflex a microscopy if leukocytes, occult  blood, or nitrites are not within normal limits); Status:Active; Requested QHD:16DSA1395;    Perform:[facility]; CKO:61UAA6189;WKSKSDC; For:Abdominal pain, left upper quadrant; Ordered By:Teresa Esqueda; Abdominal pain, left upper quadrant, Mantle cell lymphoma    · (1) BASIC METABOLIC PROFILE; Status:Active; Requested QYI:59NST3352;    Perform:[facility]; IBW:76CQD4859;FTGZBJG; For:Abdominal pain, left upper quadrant, Mantle cell lymphoma; Ordered By:Khanh Esqueda;   · (1) CBC/PLT/DIFF; Status:Active; Requested XUQ:65WRM5147;    Perform:[facility]; JFC:34NPT9623;CRZQVWU; For:Abdominal pain, left upper quadrant, Mantle cell lymphoma; Ordered By:Khanh Esqueda;   · (1) HEPATIC FUNCTION PANEL; Status:Active; Requested GGS:92MWE9735;    Perform:[facility]; JOJ:16DYU0711;YXWGOAX; For:Abdominal pain, left upper quadrant, Mantle cell lymphoma; Ordered By:Khanh Esqueda;   · (1) LD (LDH); Status:Active; Requested XEM:32GUQ7650;    Perform:[facility]; JBQ:33LXN0113;BOOZLCB; For:Abdominal pain, left upper quadrant, Mantle cell lymphoma; Ordered By:Khanh Esqueda;  Pneumonia    · * XR CHEST PA & LATERAL; Status:Active; Requested for:93Umi1138;    Perform:[facility]; Due:08Lqi4938;Ordered; For:Pneumonia; Ordered By:Teresa Esqueda; Discussion/Summary  Discussion Summary:   Left upper quadrant pain is of unclear etiology, cannot palpate a spleen, review of CAT scan from [facility] July 29 showed spleen size 14 8 compared to 19 cm in November 2015  This is reassuring   There's been no change in bowel or bladder, patient advised if pain persists or increases, there is fevers, chills or change in bowels he should go to the emergency department, otherwise complete blood work as ordered and will follow accordingly  Right lower lobe pneumonia with effusion is clinically improving, check follow-up x-ray in 6 weeks, ordered for September 15  Follow-up with me after routine labs ordered for September and this x-ray, sooner as needed  Chief Complaint  Chief Complaint Free Text Note Form: Hospital f/u      History of Present Illness  TCM Communication [facility]: The patient is being contacted for follow-up after hospitalization and HAs appt  He was hospitalized at [facility]  The date of discharge: 8/2/16  Diagnosis: pneumonia  Medications reviewed and updated today  He scheduled a follow up appointment  Follow-up appointments with other specialists: hem /onc f/u  Counseling was provided to the patient  doing ok  Communication performed and completed by eric roper   HPI: Patient was hospitalized July 29 through August 2 with right lower lobe pneumonia  He presented with intense right flank pain but never had cough or shortness of breath  He was emulated prior to discharge and oxygen saturations were 94%  He was noted to have enlarging right pleural effusion  Notably also his hemoglobin dropped from 11-9 2 during the hospital stay  He denies any melanoma or hematochezia  He felt better each day since hospitalized and then yesterday developed left-sided flank pain and malaise  There was no change in bowels  No nausea or vomiting  No fevers or chills but he did sweat last night while sleeping  In the hospital he was treated with Rocephin and Zithromax but was discharged on Levaquin  Review of Systems  Complete-Male:   Constitutional: as noted in HPI    ENT: no complaints of earache, no hearing loss, no nosebleeds, no nasal discharge, no sore throat, no hoarseness     Respiratory: No complaints of shortness of breath, no wheezing, no cough, no SOB on exertion, no orthopnea or PND  Gastrointestinal: as noted in HPI  Genitourinary: No complaints of dysuria, no incontinence, no hesitancy, no nocturia, no genital lesion, no testicular pain  Musculoskeletal: No complaints of arthralgia, no myalgias, no joint swelling or stiffness, no limb pain or swelling  Integumentary: No complaints of skin rash or skin lesions, no itching, no skin wound, no dry skin  Neurological: No compliants of headache, no confusion, no convulsions, no numbness or tingling, no dizziness or fainting, no limb weakness, no difficulty walking  Psychiatric: Is not suicidal, no sleep disturbances, no anxiety or depression, no change in personality, no emotional problems  Endocrine: No complaints of proptosis, no hot flashes, no muscle weakness, no erectile dysfunction, no deepening of the voice, no feelings of weakness  Hematologic/Lymphatic: No complaints of swollen glands, no swollen glands in the neck, does not bleed easily, no easy bruising  Active Problems    1  Abdominal pain, epigastric (789 06) (R10 13)   2  Abdominal pain, left upper quadrant (789 02) (R10 12)   3  Acute arthropathy (716 90) (M12 9)   4  Acute bronchitis (466 0) (J20 9)   5  Acute laryngitis (464 00) (J04 0)   6  Allergic rhinitis (477 9) (J30 9)   7  Anemia (285 9) (D64 9)   8  Anticoagulated (V58 61) (Z79 01)   9  Anxiety (300 00) (F41 9)   10  Arm edema (782 3) (R60 0)   11  Arthralgia (719 40) (M25 50)   12  Atrial flutter (427 32) (I48 92)   13  Benign essential hypertension (401 1) (I10)   14  Cardiomyopathy (425 4) (I42 9)   15  Chemotherapy induced nausea and vomiting (787 01,E933 1) (R11 2,T45 1X5A)   16  Chronic diastolic heart failure (114 31) (I50 32)   17  Common cold (460) (J00)   18  Congestive heart failure (428 0) (I50 9)   19  Cough (786 2) (R05)   20  Dermatitis (692 9) (L30 9)   21  Difficulty breathing (786 09) (R06 89)   22  Duodenitis without bleeding (535 60) (K29 80)   23   Hyperlipidemia (272 4) (E78 5)   24  Hypertension (401 9) (I10)   25  Hypotension (458 9) (I95 9)   26  Iatrogenic hypotension (458 29) (I95 89)   27  Lateral epicondylitis, unspecified laterality (726 32) (M77 10)   28  Lymphocytosis (288 61) (D72 820)   29  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)   30  Morbid or severe obesity due to excess calories (278 01) (E66 01)   31  Myositis (729 1) (M60 9)   32  Need for pneumococcal vaccine (V03 82) (Z23)   33  Need for prophylactic vaccination and inoculation against influenza (V04 81) (Z23)   34  Obesity (278 00) (E66 9)   35  Paroxysmal atrial fibrillation (427 31) (I48 0)   36  Persistent atrial fibrillation (427 31) (I48 1)   37  Pneumonia (486) (J18 9)   38  Prostatic hyperplasia, benign localized (600 20) (N40 0)   39  Skin rash (782 1) (R21)   40  Splenomegaly (789 2) (R16 1)   41  Thrombocytopenia (287 5) (D69 6)   42  Trigeminal neuralgia (350 1) (G50 0)   43  Viral infection (079 99) (B34 9)    Past Medical History    1  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)   2  History of Bursitis of hip, unspecified laterality (726 5) (M70 70)   3  History of Chronic kidney insufficiency (585 9) (N18 9)   4  History of Gastroduodenitis (535 50)   5  History of acute bronchitis (V12 69) (Z87 09)   6  History of gastritis (V12 79) (Z87 19)   7  History of hypotension (V12 59) (Z86 79)   8  History of nausea and vomiting (V12 79) (Z87 898)   9  History of upper respiratory infection (V12 09) (Z87 09)   10  History of Malignant Neoplasm Of The Prostate Gland (V10 46)   11  History of Morbid or severe obesity due to excess calories (278 01) (E66 01)   12  History of Prostate Cancer (V10 46)    Surgical History    1  History of Appendectomy   2  History of Complete Colonoscopy   3  History of Diagnostic Esophagogastroduodenoscopy   4  History of Inguinal Hernia Repair   5  History of Needle Biopsy Of Prostate   6  History of Rhinoplasty   7   History of Tonsillectomy  Surgical History Reviewed: The surgical history was reviewed and updated today  Family History  Mother    1  Family history of Coronary Artery Disease (V17 49)  Father    2  Family history of Congestive Heart Failure   3  Family history of Coronary Artery Disease (V17 49)  Family History Reviewed: The family history was reviewed and updated today  Social History    · Former smoker (L22 23) (B32 385)   · Marital History - Currently    · Never Drank Alcohol   · Never Used Drugs  Social History Reviewed: The social history was reviewed and updated today  Current Meds   1  Aspirin 81 MG TABS Recorded   2  Avodart 0 5 MG Oral Capsule; TAKE 1 CAPSULE Daily Recorded   3  Desonide 0 05 % External Cream; APPLY SPARINGLY TO Left orbit 2 TO 3 TIMES DAILY   as needed; Therapy: 84Owd0605 to (Last Rx:53Bde2069)  Requested for: 30YTU2489; Status: ACTIVE -   Transmit to Pharmacy - Awaiting Verification Ordered   4  Escitalopram Oxalate 10 MG Oral Tablet; TAKE 1 TABLET DAILY; Therapy: 17VRR0913 to (Evaluate:18Oct2016)  Requested for: 21Apr2016; Last   Rx:21Apr2016 Ordered   5  FaBB 2 2-25-1 MG Oral Tablet; take 1 tablet by mouth every day; Therapy: 52UEX1798 to (Evaluate:76Fww9464)  Requested for: 78Iwn2384; Last   Rx:41Bti7808 Ordered   6  Flomax 0 4 MG Oral Capsule; 1 PO QD Recorded   7  Fluticasone Propionate 50 MCG/ACT Nasal Suspension; USE 2 SPRAYS IN EACH   NOSTRIL ONCE DAILY; Therapy: 67Qqe1311 to (Evaluate:11Oct2016)  Requested for: 13RIG5096; Last   Rx:50Gfv8886 Ordered   8  Furosemide 40 MG Oral Tablet; Take 1 tablet daily; Therapy: 61CIT2023 to (Evaluate:06Jan2017)  Requested for: 41XWJ7713; Last   Rx:77Oit0878 Ordered   9  Klor-Con M20 20 MEQ Oral Tablet Extended Release; 1/2  tab daily  Requested for:   27Nov2015; Last Rx:27Nov2015 Ordered   10  Losartan Potassium 25 MG Oral Tablet; take 1/2 tablet daily  Requested for: 29WDD9057;    Last Rx:19Jan2016 Ordered   11   Metoprolol Tartrate 50 MG Oral Tablet; TAKE 1/2 TABLET TWICE DAILY; Therapy: 64QZE9234 to [phone])  Requested for: 54QTO8129; Last    Rx:06Nov2015 Ordered   12  Mucinex DM Maximum Strength  MG Oral Tablet Extended Release 12 Hour;    TAKE 1 TABLET EVERY 12 HOURS AS NEEDED FOR CONGESTION; Therapy: 43MXW2824 to (Evaluate:10Jun2016); Last Rx:57Jmx5257 Ordered   13  Multivitamins Oral Capsule; TAKE 1 CAPSULE Daily Recorded   14  PredniSONE 10 MG Oral Tablet; TAKE 3 TABLETS DAILY FOR 2 DAYS, 2 TABLETS DAILY    FOR 2 DAYS AND 1 TABLET DAILY FOR 2 DAYS, THEN STOP; Therapy: 44AYL8856 to (Last Rx:59Cgk3042) Ordered   15  Promethazine-Codeine 6 25-10 MG/5ML Oral Syrup; TAKE 5 MLS BY MOUTH EVERY 4    HOURS AS NEEDED FOR COUGH; Therapy: 14VPP5657 to (Evaluate:04Jun2016); Last Rx:42Hkc6529 Ordered   16  Prostate Health Oral Capsule; TAKE 1 CAPSULE Daily Recorded   17  Revlimid 15 MG Oral Capsule; TAKE 1 CAPSULE DAILY; Therapy: 50YCQ8647 to (Evaluate:62Yet6283)  Requested for: 17Zcu3712; Last    Rx:06Blg8299 Ordered   18  Simvastatin 20 MG Oral Tablet; take 1 tablet by mouth daily; Therapy: 58Bys1901 to (Evaluate:19Mar2017)  Requested for: 70AJL8815; Last    Rx:24Mar2016 Ordered  Medication List Reviewed: The medication list was reviewed and updated today  Allergies    1  No Known Drug Allergies    2  Seasonal    Physical Exam    Constitutional   General appearance: No acute distress, well appearing and well nourished  Eyes   Conjunctiva and lids: No swelling, erythema, or discharge  Pupils and irises: Equal, round and reactive to light  Ears, Nose, Mouth, and Throat   External inspection of ears and nose: Normal     Oropharynx: Normal with no erythema, edema, exudate or lesions  Pulmonary   Respiratory effort: No increased work of breathing or signs of respiratory distress  Auscultation of lungs: Clear to auscultation, equal breath sounds bilaterally, no wheezes, no rales, no rhonci      Cardiovascular Auscultation of heart: Normal rate and rhythm, normal S1 and S2, without murmurs  Examination of extremities for edema and/or varicosities: Normal     Carotid pulses: Normal     Abdomen   Abdomen: Abnormal   Left upper quadrant is exquisitely tender to deep palpation with no masses or palpable organs  Liver and spleen: No hepatomegaly or splenomegaly  Lymphatic   Palpation of lymph nodes in neck: No lymphadenopathy  Musculoskeletal   Gait and station: Normal     Inspection/palpation of joints, bones, and muscles: Abnormal     Skin   Examination of the skin for lesions: Abnormal     Neurologic   Cranial nerves: Cranial nerves 2-12 intact  Psychiatric   Orientation to person, place and time: Normal     Mood and affect: Normal          Health Management  Health Maintenance   COLONOSCOPY; every 5 years; Next Due: 53BWF9868; Overdue    Future Appointments    Date/Time Provider Specialty Site   08/09/2016 03:45 PM MARCIAL Hastings  Hematology [facility]  [facility]   09/29/2016 09:30 AM MARCIAL Boyd  Internal Medicine [facility]   10/26/2016 10:30 AM MARCIAL Barahona   Cardiology  [address]     Signatures   Electronically signed by : MARCIAL Montemayor ; Aug  5 2016  4:02PM EST                       (Author)

## 2018-01-11 NOTE — MISCELLANEOUS
Assessment    1  Myositis (729 1) (M60 9)    Discussion/Summary  Discussion Summary:   Myositis of unclear etiology, I have recommended that he try to elevate the arm, I have applied an Ace wrap for now  He is to follow up with orthopedics on Monday  I discussed if symptoms should get worse he should contact us or seek medical attention immediately  Chief Complaint  Chief Complaint Free Text Note Form: Hospital follow-up      History of Present Illness  TCM Communication [facility]: The patient is being contacted for follow-up after hospitalization and Spoke with pt on 3/17  Pain and swelling in elbow are improved but still present  He was hospitalized at [facility]  The date of discharge: 3/16/16  Diagnosis: Myositis of arm  He was discharged to home  Medications reviewed and updated today  He did not schedule a follow up appointment  Communication performed and completed by QI Guajardo       HPI: Patient was in the hospital for 48 hours for swelling of the left elbow, there cause is still unknown they're calling it and inflammation of the muscle, there does not appear to be infection  He states he has significant swelling around the elbow extending down now into the wrist and the fingers  No fevers or chills      Review of Systems  Complete-Male:   Constitutional: No fever or chills, feels well, no tiredness, no recent weight gain or weight loss  Eyes: No complaints of eye pain, no red eyes, no discharge from eyes, no itchy eyes  ENT: no complaints of earache, no hearing loss, no nosebleeds, no nasal discharge, no sore throat, no hoarseness  Cardiovascular: No complaints of slow heart rate, no fast heart rate, no chest pain, no palpitations, no leg claudication, no lower extremity  Respiratory: No complaints of shortness of breath, no wheezing, no cough, no SOB on exertion, no orthopnea or PND     Gastrointestinal: No complaints of abdominal pain, no constipation, no nausea or vomiting, no diarrhea or bloody stools  Genitourinary: No complaints of dysuria, no incontinence, no hesitancy, no nocturia, no genital lesion, no testicular pain  Musculoskeletal: No complaints of arthralgia, no myalgias, no joint swelling or stiffness, no limb pain or swelling  Integumentary: No complaints of skin rash or skin lesions, no itching, no skin wound, no dry skin  Neurological: No compliants of headache, no confusion, no convulsions, no numbness or tingling, no dizziness or fainting, no limb weakness, no difficulty walking  Psychiatric: Is not suicidal, no sleep disturbances, no anxiety or depression, no change in personality, no emotional problems  Endocrine: No complaints of proptosis, no hot flashes, no muscle weakness, no erectile dysfunction, no deepening of the voice, no feelings of weakness  Hematologic/Lymphatic: No complaints of swollen glands, no swollen glands in the neck, does not bleed easily, no easy bruising  Active Problems    1  Abdominal pain, epigastric (789 06) (R10 13)   2  Abdominal pain, left upper quadrant (789 02) (R10 12)   3  Acute arthropathy (716 90) (M12 9)   4  Acute bronchitis (466 0) (J20 9)   5  Allergic rhinitis (477 9) (J30 9)   6  Anemia (285 9) (D64 9)   7  Anticoagulated (V58 61) (Z79 01)   8  Anxiety (300 00) (F41 9)   9  Arm edema (782 3) (R60 0)   10  Atrial flutter (427 32) (I48 92)   11  Benign essential hypertension (401 1) (I10)   12  Chemotherapy induced nausea and vomiting (787 01,E933 1) (R11 2,T45 1X5A)   13  Chronic diastolic heart failure (618 15) (I50 32)   14  Common cold (460) (J00)   15  Congestive heart failure (428 0) (I50 9)   16  Difficulty breathing (786 09) (R06 89)   17  Duodenitis without bleeding (535 60) (K29 80)   18  Hyperlipidemia (272 4) (E78 5)   19  Hypertension (401 9) (I10)   20  Hypotension (458 9) (I95 9)   21  Iatrogenic hypotension (458 29) (I95 89)   22  Lateral epicondylitis, unspecified laterality (726 32) (M77 10)   23  Lymphocytosis (288 61) (D72 820)   24  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)   25  Morbid or severe obesity due to excess calories (278 01) (E66 01)   26  Need for pneumococcal vaccine (V03 82) (Z23)   27  Need for prophylactic vaccination and inoculation against influenza (V04 81) (Z23)   28  Paroxysmal atrial fibrillation (427 31) (I48 0)   29  Persistent atrial fibrillation (427 31) (I48 1)   30  Pneumonia (486) (J18 9)   31  Prostatic hyperplasia, benign localized (600 20) (N40 0)   32  Skin rash (782 1) (R21)   33  Splenomegaly (789 2) (R16 1)   34  Thrombocytopenia (287 5) (D69 6)   35  Trigeminal neuralgia (350 1) (G50 0)   36  Viral infection (079 99) (B34 9)    Past Medical History    1  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)   2  History of Bursitis of hip, unspecified laterality (726 5) (M70 70)   3  History of Chronic kidney insufficiency (585 9) (N18 9)   4  History of Gastroduodenitis (535 50)   5  History of acute bronchitis (V12 69) (Z87 09)   6  History of gastritis (V12 79) (Z87 19)   7  History of hypotension (V12 59) (Z86 79)   8  History of nausea and vomiting (V12 79) (Z87 898)   9  History of upper respiratory infection (V12 09) (Z87 09)   10  History of Malignant Neoplasm Of The Prostate Gland (V10 46)   11  History of Morbid or severe obesity due to excess calories (278 01) (E66 01)   12  History of Prostate Cancer (V10 46)    Surgical History    1  History of Appendectomy   2  History of Complete Colonoscopy   3  History of Diagnostic Esophagogastroduodenoscopy   4  History of Inguinal Hernia Repair   5  History of Needle Biopsy Of Prostate   6  History of Rhinoplasty   7  History of Tonsillectomy  Surgical History Reviewed: The surgical history was reviewed and updated today  Family History    1  Family history of Coronary Artery Disease (V17 49)    2  Family history of Congestive Heart Failure   3   Family history of Coronary Artery Disease (V17 49)  Family History Reviewed: The family history was reviewed and updated today  Social History    · Former smoker (R38 71) (D25 260)   · Marital History - Currently    · Never Drank Alcohol   · Never Used Drugs  Social History Reviewed: The social history was reviewed and updated today  Current Meds   1  Aspirin 81 MG Oral Tablet Recorded   2  Avodart 0 5 MG Oral Capsule; TAKE 1 CAPSULE Daily Recorded   3  Escitalopram Oxalate 10 MG Oral Tablet; TAKE 1 TABLET DAILY; Therapy: 07GDU7516 to (([phone])  Requested for: 12Oct2015; Last   Rx:12Oct2015 Ordered   4  FaBB 2 2-25-1 MG Oral Tablet; take 1 tablet by mouth every day; Therapy: 06ZUA7444 to (Evaluate:93Lng2317)  Requested for: 12XVN2520; Last   Rx:20Mtj2038 Ordered   5  Flomax 0 4 MG Oral Capsule; 1 PO QD Recorded   6  Fluticasone Propionate 50 MCG/ACT Nasal Suspension; USE 2 SPRAYS IN EACH   NOSTRIL ONCE DAILY; Therapy: 30Apr2015 to (Evaluate:22Jan2016)  Requested for: 33ISN8180; Last   Rx:24Ixu9023 Ordered   7  Furosemide 40 MG Oral Tablet; Take 1 tablet daily  Requested for: 98XPO7064; Last   Rx:27Nov2015 Ordered   8  Klor-Con M20 20 MEQ Oral Tablet Extended Release; 1/2  tab daily  Requested for:   27Nov2015; Last Rx:27Nov2015 Ordered   9  Losartan Potassium 25 MG Oral Tablet; take 1/2 tablet daily  Requested for: 38JGA3066;   Last Rx:19Jan2016 Ordered   10  Metoprolol Tartrate 50 MG Oral Tablet; TAKE 1/2 TABLET TWICE DAILY; Therapy: 25DWP6856 to (Mauricio Atkins)  Requested for: 33HRB8639; Last    Rx:06Nov2015 Ordered   11  Multivitamins Oral Capsule; TAKE 1 CAPSULE Daily Recorded   12  Prostate Health Oral Capsule; TAKE 1 CAPSULE Daily Recorded   13  Revlimid 15 MG Oral Capsule; TAKE 1 CAPSULE DAILY; Therapy: 25ZPQ1207 to (Evaluate:29Mar2016); Last Rx:08Mar2016 Ordered   14  Simvastatin 20 MG Oral Tablet; TAKE 1/2 TABLET BY MOUTH DAILY;     Therapy: 21Apr2014 to (Anastacia Gutierrez)  Requested for: 19XRL8769; Last    Rx:11Mar2016 Ordered  Medication List Reviewed: The medication list was reviewed and updated today  Allergies    1  No Known Drug Allergies    2  Seasonal    Physical Exam    Constitutional   General appearance: No acute distress, well appearing and well nourished  Skin   Skin and subcutaneous tissue: Abnormal   There is significant swelling around the left elbow that extends down into the wrist and fingers, positive pulse, there is warmth to the elbow as well  Health Management  Health Maintenance   COLONOSCOPY; every 5 years; Next Due: 35OUG5269; Overdue    Future Appointments    Date/Time Provider Specialty Site   03/29/2016 10:30 AM MARCIAL Hargrove  Hematology [facility]cology [facility]  [facility]   09/29/2016 09:30 AM MARCIAL Brewer  [address] [facility]   05/25/2016 11:15 AM MARCIAL Rodgers   Cardiology  [address]     Signatures   Electronically signed by : BIJAN Foy; Mar 18 2016 12:52PM EST                       (Author)    Electronically signed by : MARCIAL Mcclain ; Mar 18 2016  5:29PM EST

## 2018-01-12 VITALS
HEART RATE: 94 BPM | SYSTOLIC BLOOD PRESSURE: 110 MMHG | DIASTOLIC BLOOD PRESSURE: 62 MMHG | WEIGHT: 155.13 LBS | HEIGHT: 62 IN | BODY MASS INDEX: 28.55 KG/M2 | RESPIRATION RATE: 16 BRPM

## 2018-01-12 NOTE — PROGRESS NOTES
Assessment  Assessed    1  Benign essential hypertension (401 1) (I10)   2  Chronic diastolic heart failure (847 12) (I50 32)   3  Hyperlipidemia (272 4) (E78 5)   4  Paroxysmal atrial fibrillation (427 31) (I48 0)   5  Anemia (285 9) (D64 9)    Plan  Hyperlipidemia    · Follow-up visit in 4 Months Evaluation and Treatment  Follow-up  Status: Complete   Done: 46JJZ9233   Ordered; For: Hyperlipidemia; Ordered By: Bassem Beck Performed:  Due: 53IXD2036; Last Updated By: Nichelle León; 1/25/2016 1:24:58 PM    Discussion/Summary  Cardiology Discussion Summary Free Text Note Form [facility]:   Pt is cardiovascularly stable  presently maintaining sinus rhythm  Because of persistent anemia, no choice but to discontinue Eliquis anticoagulation  Patient and family understand the risks and benefits of anticoagulation  fu with pcp  fu with heme/onc tomorrow  rest of the medications were reviewed  Emotional support provided to the patient  Continue all meds  Report any symptoms  All questions answered  Previous studies reviewed with patient, medications reviewed and possible side effects discussed  Continue risk factor modification  All questions answered  Safety measures reviewed  Patient advised to report any problems prompting medical attention  Return for follow up visit in 4 months or earlier if needed  Counseling Documentation With Imm: The patient, patient's family was counseled regarding diagnostic results, instructions for management, risk factor reductions, impressions, risks and benefits of treatment options, importance of compliance with treatment  Chief Complaint  Chief Complaint Free Text Note Form: Patient reports to the office for a routine cardiac follow up visit  Has a history of aflutter/fib  Chief Complaint Chronic Condition [facility]: Patient is here today for follow up of chronic conditions described in HPI  History of Present Illness  HPI: Patient here for followup visit   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  he developed afib/flutter during treatment  he also has anemia with hemoglobin down to 6 gm% range and has been off anticoagulation  He state that he is feeling well  he states that he is due to restart chemotherapy  Patient denies any chest pain, heaviness  Denies any palpitations, syncope or bleeding from any site  he is here with his wife  he is taking all meds and does not need any refills  Review of Systems  Cardiology Male ROS:     Cardiac: No complaints of chest pain, no palpitations, no fainiting  and as noted in HPI  Skin: No complaints of nonhealing sores or skin rash  Genitourinary: No complaints of recurrent urinary tract infections, frequent urination at night, difficult urination, blood in urine, kidney stones, loss of bladder control, no kidney or prostate problems, no erectile dysfunction  Psychological: No complaints of feeling depressed, anxiety, panic attacks, or difficulty concentrating  General: No complaints of trouble sleeping, lack of energy, fatigue, appetite changes, weight changes, fever, frequent infections, or night sweats  Respiratory: No complaints of shortness of breath, cough with sputum, or wheezing  HEENT: No complaints of serious problems, hearing problems, nose problems, throat problems, or snoring  Gastrointestinal: No complaints of liver problems, nausea, vomiting, heartburn, constipation, bloody stools, diarrhea, problems swallowing, adbominal pain, or rectal bleeding  Hematologic: as noted in HPI   Neurological: No complaints of numbness, tingling, dizziness, weakness, seizures, headaches, syncope or fainting, AM fatigue, daytime sleepiness, no witnessed apnea episodes  Musculoskeletal: No complaints of arthritis, back pain, or painfull swelling  ROS Reviewed:   ROS reviewed  Active Problems  Problems    1  Abdominal pain, epigastric (139 06) (R10 13)   2   Abdominal pain, left upper quadrant (789 02) (R10 12)   3  Acute bronchitis (466 0) (J20 9)   4  Allergic rhinitis (477 9) (J30 9)   5  Anemia (285 9) (D64 9)   6  Anticoagulated (V58 61) (Z79 01)   7  Anxiety (300 00) (F41 9)   8  Atrial flutter (427 32) (I48 92)   9  Benign essential hypertension (401 1) (I10)   10  Chemotherapy induced nausea and vomiting (787 01,E933 1) (R11 2,T45 1X5A)   11  Chronic diastolic heart failure (528 79) (I50 32)   12  Congestive heart failure (428 0) (I50 9)   13  Difficulty breathing (786 09) (R06 89)   14  Duodenitis without bleeding (535 60) (K29 80)   15  Hyperlipidemia (272 4) (E78 5)   16  Hypertension (401 9) (I10)   17  Hypotension (458 9) (I95 9)   18  Iatrogenic hypotension (458 29) (I95 89)   19  Lateral epicondylitis, unspecified laterality (726 32) (M77 10)   20  Lymphocytosis (288 61) (D72 820)   21  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)   22  Morbid or severe obesity due to excess calories (278 01) (E66 01)   23  Need for pneumococcal vaccine (V03 82) (Z23)   24  Need for prophylactic vaccination and inoculation against influenza (V04 81) (Z23)   25  Persistent atrial fibrillation (427 31) (I48 1)   26  Pneumonia (486) (J18 9)   27  Prostatic hyperplasia, benign localized (600 20) (N40 0)   28  Skin rash (782 1) (R21)   29  Splenomegaly (789 2) (R16 1)   30  Thrombocytopenia (287 5) (D69 6)   31  Trigeminal neuralgia (350 1) (G50 0)   32  Viral infection (079 99) (B34 9)    Past Medical History  Problems    1  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)   2  History of Bursitis of hip, unspecified laterality (726 5) (M70 70)   3  History of Chronic kidney insufficiency (585 9) (N18 9)   4  History of Gastroduodenitis (535 50)   5  History of acute bronchitis (V12 69) (Z87 09)   6  History of gastritis (V12 79) (Z87 19)   7  History of hypotension (V12 59) (Z86 79)   8  History of nausea and vomiting (V12 79) (Z87 898)   9  History of upper respiratory infection (V12 09) (Z87 09)   10   History of Malignant Neoplasm Of The Prostate Gland (V10 46)   11  History of Morbid or severe obesity due to excess calories (278 01) (E66 01)   12  History of Prostate Cancer (V10 46)  Active Problems And Past Medical History Reviewed: The active problems and past medical history were reviewed and updated today  Surgical History  Problems    1  History of Appendectomy   2  History of Complete Colonoscopy   3  History of Diagnostic Esophagogastroduodenoscopy   4  History of Inguinal Hernia Repair   5  History of Needle Biopsy Of Prostate   6  History of Rhinoplasty   7  History of Tonsillectomy  Surgical History Reviewed: The surgical history was reviewed and updated today  Family History  Mother    1  Family history of Coronary Artery Disease (V17 49)  Father    2  Family history of Congestive Heart Failure   3  Family history of Coronary Artery Disease (V17 49)  Family History Reviewed: The family history was reviewed and updated today  Social History  Problems    · Former smoker (V15 82) (I14 279)   · Marital History - Currently    · Never Drank Alcohol   · Never Used Drugs  Social History Reviewed: The social history was reviewed and updated today  The social history was reviewed and is unchanged  Current Meds   1  Aspirin 81 MG Oral Tablet Recorded   2  Avodart 0 5 MG Oral Capsule; TAKE 1 CAPSULE Daily Recorded   3  Escitalopram Oxalate 10 MG Oral Tablet; TAKE 1 TABLET DAILY; Therapy: 55LRG5790 to ([place])  Requested for: 12Oct2015; Last   Rx:35Ptn0510 Ordered   4  FaBB 2 2-25-1 MG Oral Tablet; take 1 tablet by mouth every day; Therapy: 34CRD7706 to (Evaluate:03Kjc4584)  Requested for: 48Nyr0110; Last   Rx:37Ktt6722 Ordered   5  Flomax 0 4 MG Oral Capsule; 1 PO QD Recorded   6  Fluticasone Propionate 50 MCG/ACT Nasal Suspension; USE 2 SPRAYS IN EACH   NOSTRIL ONCE DAILY; Therapy: 98Ena0679 to (Evaluate:22Jan2016)  Requested for: 13LIA9702;  Last   Rx:32Piq8748 Ordered   7  Furosemide 40 MG Oral Tablet; Take 1 tablet daily  Requested for: 45WAQ3782; Last   Rx:27Nov2015 Ordered   8  Klor-Con M20 20 MEQ Oral Tablet Extended Release; 1/2  tab daily  Requested for:   27Nov2015; Last Rx:27Nov2015 Ordered   9  Losartan Potassium 25 MG Oral Tablet; take 1/2 tablet daily  Requested for: 51OHP8991;   Last Rx:19Jan2016 Ordered   10  Metoprolol Tartrate 50 MG Oral Tablet; TAKE 1/2 TABLET TWICE DAILY; Therapy: 98RWC6342 to (Paulina Raymond)  Requested for: 90AAC1753; Last    Rx:06Nov2015 Ordered   11  Multivitamins Oral Capsule; TAKE 1 CAPSULE Daily Recorded   12  Prostate Health Oral Capsule; TAKE 1 CAPSULE Daily Recorded   13  Revlimid 15 MG Oral Capsule; TAKE 1 CAPSULE DAILY DAYS 1-14; Therapy: 57IXD0866 to (Evaluate:21Apr2016); Last Rx:14Jan2016 Ordered   14  Simvastatin 20 MG Oral Tablet; take 1 tablet by mouth daily; Therapy: 21Apr2014 to (Jane Coronado)  Requested for: 737 219 628; Last    Rx:07Apr2015 Ordered  Medication List Reviewed: The medication list was reviewed and updated today  Allergies  Medication    1  No Known Drug Allergies  Non-Medication    2  Seasonal    Vitals  Vital Signs [Data Includes: Current Encounter]    Recorded: 71VQW2318 10:49AM   Heart Rate 64   Systolic 043   Diastolic 68   Height 5 ft 2 5 in   Weight 176 lb 8 0 oz   BMI Calculated 31 77   BSA Calculated 1 83     Physical Exam    Constitutional   General appearance: No acute distress, well appearing and well nourished  Ears, Nose, Mouth, and Throat - External inspection of ears and nose: Normal without deformities or discharge  Oropharynx: Clear, nares are clear, mucous membranes are moist    Pulmonary   Respiratory effort: No increased work of breathing or signs of respiratory distress  Auscultation of lungs: Clear to auscultation, no rales, no rhonchi, no wheezing, good air movement  Cardiovascular   Palpation of heart: Normal PMI, no thrills      Auscultation of heart: Abnormal   irregular  Carotid pulses: Normal, 2+ bilaterally  Peripheral vascular exam: Normal pulses throughout, no tenderness, erythema or swelling  Examination of extremities for edema and/or varicosities: Normal     Musculoskeletal Gait and station: Normal gait  Skin - Skin and subcutaneous tissue: Normal without rashes or lesions  Skin is warm and well perfused, normal turgor  Neurologic - Cranial nerves: II - XII intact  Speech: Normal     Psychiatric - Orientation to person, place, and time: Normal  Mood and affect: Normal       Results/Data  Diagnostic Studies Reviewed Cardio: I personally reviewed the recording/images in the office today  My interpretation follows  Other: Events of recent hospitalization and cardiac work up reviewed with patient and family  Health Management  Health Maintenance   COLONOSCOPY; every 5 years; Next Due: 21DZB9260; Overdue    Attending Note  Collaborating Physician Note: Collaborating Physician: I interviewed and examined the patient, I supervised the Advanced Practitioner and I agree with the Advanced Practitioner note  Future Appointments    Date/Time Provider Specialty Site   01/26/2016 09:15 AM MARCIAL Oropeza  Hematology [facility]  [facility]   02/09/2016 10:45 AM MARCIAL Oropeza  Hematology [facility]cology [facility]  [facility]   03/11/2016 09:15 AM MARCIAL España  [address] [place] [address]   05/25/2016 11:15 AM MARCIAL Carr   [facility]   Electronically signed by : Cortney Mulligan, [facility]; Jan 25 2016 11:59AM EST                       (Author)    Electronically signed by : MARCIAL Mcdowell ; Jan 25 2016  8:18PM EST                       (Author)

## 2018-01-12 NOTE — MISCELLANEOUS
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)   2  Paroxysmal atrial fibrillation (427 31) (I48 0)   3  DVT (deep venous thrombosis) (453 40) (I82 409)    Discussion/Summary  Discussion Summary:   Mantle cell lymphoma following with hematology now with Htiloi-g-Ijlx scheduled for next chemotherapy next Friday labs twice a week    A  fib heart rate currently normal sinus rhythm without evidence didn't of decompensated heart failure off all anticoagulants monitor symptoms closely  Chief Complaint  Chief Complaint Free Text Note Form: hospital follow up      History of Present Illness  TCM Communication [facility]: The patient is being contacted for follow-up after hospitalization and left message x 2  He was hospitalized at [facility]  The date of discharge: 11/26  Diagnosis: nausea/vomiting/heart failure  He was discharged to home  Medications were not reviewed today  He did not schedule a follow up appointment  Communication performed and completed by shukri   HPI: Patient had experienced nausea vomiting and was told to go to the emergency room down in [place] he ended up being admitted and was there for approximately 10 days he had some issues with chemotherapy and his blood counts he get again was given Rituxan and had an allergic reaction to it  After discharge he had an Edzyup-m-Mcjk placed  He is feeling fairly well  Sleep is reasonable appetite is good but he continues to lose weight  No chest pain shortness of breath palpitations orthopnea PND or swelling of the lower extremities  Review of Systems  Complete-Male:   Constitutional: No fever or chills, feels well, no tiredness, no recent weight gain or weight loss  Eyes: No complaints of eye pain, no red eyes, no discharge from eyes, no itchy eyes  ENT: no complaints of earache, no hearing loss, no nosebleeds, no nasal discharge, no sore throat, no hoarseness     Cardiovascular: No complaints of slow heart rate, no fast heart rate, no chest pain, no palpitations, no leg claudication, no lower extremity  Respiratory: No complaints of shortness of breath, no wheezing, no cough, no SOB on exertion, no orthopnea or PND  Gastrointestinal: No complaints of abdominal pain, no constipation, no nausea or vomiting, no diarrhea or bloody stools  Genitourinary: No complaints of dysuria, no incontinence, no hesitancy, no nocturia, no genital lesion, no testicular pain  Musculoskeletal: No complaints of arthralgia, no myalgias, no joint swelling or stiffness, no limb pain or swelling  Integumentary: No complaints of skin rash or skin lesions, no itching, no skin wound, no dry skin  Neurological: No compliants of headache, no confusion, no convulsions, no numbness or tingling, no dizziness or fainting, no limb weakness, no difficulty walking  Psychiatric: Is not suicidal, no sleep disturbances, no anxiety or depression, no change in personality, no emotional problems  Endocrine: No complaints of proptosis, no hot flashes, no muscle weakness, no erectile dysfunction, no deepening of the voice, no feelings of weakness  Hematologic/Lymphatic: No complaints of swollen glands, no swollen glands in the neck, does not bleed easily, no easy bruising  Active Problems     1  Abnormal chest xray (793 2) (R93 8)   2  Acute deep vein thrombosis (DVT) of femoral vein of left lower extremity (453 41)   (I82 412)   3  Allergic rhinitis (477 9) (J30 9)   4  Anemia (285 9) (D64 9)   5  Anticoagulated (V58 61) (Z79 01)   6  Anxiety (300 00) (F41 9)   7  Benign essential hypertension (401 1) (I10)   8  Cardiomyopathy (425 4) (I42 9)   9  Chronic diastolic heart failure (070 05) (I50 32)   10  Colon cancer screening (V76 51) (Z12 11)   11  Congestive heart failure (428 0) (I50 9)   12  DVT (deep venous thrombosis) (453 40) (I82 409)   13  Flu vaccine need (V04 81) (Z23)   14  Hyperlipidemia (272 4) (E78 5)   15  Hypertension (401 9) (I10)   16  Lymphocytosis (288 61) (D72 820)   17  Morbid or severe obesity due to excess calories (278 01) (E66 01)   18  Myositis (729 1) (M60 9)   19  Need for pneumococcal vaccine (V03 82) (Z23)   20  Need for prophylactic vaccination and inoculation against influenza (V04 81) (Z23)   21  Obesity (278 00) (E66 9)   22  Paroxysmal atrial fibrillation (427 31) (I48 0)   23  Pleural effusion (511 9) (J90)   24  Prostatic hyperplasia, benign localized (600 20) (N40 0)   25  Splenomegaly (789 2) (R16 1)   26  Thrombocytopenia (287 5) (D69 6)   27  Trigeminal neuralgia (350 1) (G50 0)    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)          Past Medical History    1  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)   2  History of Bursitis of hip, unspecified laterality (726 5) (M70 70)   3  History of Chronic kidney insufficiency (585 9) (N18 9)   4  History of Gastroduodenitis (535 50)   5  History of acute bronchitis (V12 69) (Z87 09)   6  History of gastritis (V12 79) (Z87 19)   7  History of hypotension (V12 59) (Z86 79)   8  History of nausea and vomiting (V12 79) (Z87 898)   9  History of upper respiratory infection (V12 09) (Z87 09)   10  History of Malignant Neoplasm Of The Prostate Gland (V10 46)   11  History of Morbid or severe obesity due to excess calories (278 01) (E66 01)   12  History of Prostate Cancer (V10 46)    Surgical History    1  History of Appendectomy   2  History of Complete Colonoscopy   3  History of Diagnostic Esophagogastroduodenoscopy   4  History of Inguinal Hernia Repair   5  History of Needle Biopsy Of Prostate   6  History of Rhinoplasty   7  History of Tonsillectomy  Surgical History Reviewed: The surgical history was reviewed and updated today  Family History  Mother    1  Family history of Coronary Artery Disease (V17 49)  Father    2  Family history of Congestive Heart Failure   3  Family history of Coronary Artery Disease (V17 49)  Family History Reviewed:    The family history was reviewed and updated today  Social History    · Former smoker (K41 62) (E26 281)   · Marital History - Currently    · Never Drank Alcohol   · Never Used Drugs  Social History Reviewed: The social history was reviewed and updated today  Current Meds   1  Acidophilus/Pectin Oral Capsule Recorded   2  Avodart 0 5 MG Oral Capsule; TAKE 1 CAPSULE Daily Recorded   3  Clindamycin Phosphate 1 % External Gel; APPLY AND GENTLY MASSAGE INTO   AFFECTED AREA(S) TWICE DAILY; Therapy: 35UIN2983 to (Last Rx:01Nov2016)  Requested for: 93PJZ2733 Ordered   4  Desonide 0 05 % External Cream; APPLY SPARINGLY TO Left orbit 2 TO 3 TIMES DAILY   as needed; Therapy: 07Tnh8765 to (Last Michela Peaches)  Requested for: 26Mtb6357 Ordered   5  Eliquis 5 MG Oral Tablet; 1 tab bid; Therapy: 24CKF4089 to (Last Earvin May)  Requested for: 35Xfp0022 Ordered   6  Escitalopram Oxalate 10 MG Oral Tablet; TAKE 1 TABLET DAILY; Therapy: 48JPD1252 to (Evaluate:26Apr2017)  Requested for: 98VEP2185; Last   Rx:28Oct2016 Ordered   7  FaBB 2 2-25-1 MG Oral Tablet; take 1 tablet by mouth every day; Therapy: 93UOQ0376 to (Evaluate:07Dou9284)  Requested for: 95Pyj1472; Last   Rx:40Uwr7766 Ordered   8  Flomax 0 4 MG Oral Capsule; 1 PO QD Recorded   9  Fluticasone Propionate 50 MCG/ACT Nasal Suspension; USE 2 SPRAYS IN EACH   NOSTRIL ONCE DAILY; Therapy: 20Swx0376 to (Evaluate:11Oct2016)  Requested for: 76RIN5955; Last   Rx:81Bmo0156 Ordered   10  Furosemide 40 MG Oral Tablet; Take 1 tablet daily; Therapy: 00OEB8310 to (Evaluate:06Jan2017)  Requested for: 05GHK0563; Last    Rx:25Ruc2785 Ordered   11  Klor-Con M20 20 MEQ Oral Tablet Extended Release; 1/2  tab daily  Requested for:    27Nov2015; Last Rx:27Nov2015 Ordered   12  Losartan Potassium 25 MG Oral Tablet; Take 1 tablet daily; Therapy: 14WAQ2283 to (Evaluate:20Mar2017)  Requested for: 71WNY8340; Last    Rx:79Vhn9041 Ordered   13   Metoprolol Tartrate 50 MG Oral Tablet; TAKE 1/2 TABLET TWICE DAILY; Therapy: 74DDW8382 to (Piero Carpio)  Requested for: 67WIG1257; Last    Rx:06Nov2015 Ordered   14  Multivitamins Oral Capsule; TAKE 1 CAPSULE Daily Recorded   15  Ondansetron 4 MG Oral Tablet Dispersible; 1 by mouth every 6 hours as needed for    nausea/vomiting; Therapy: 40ZKJ0851 to (Last Rx:77Wuw2978)  Requested for: 52YQC7136 Ordered   16  PredniSONE 50 MG Oral Tablet; 1 PO daily Day 1-5 of chemotherapy treatment; Therapy: 12DIG1219 to (Last Rx:96Zmh6873)  Requested for: 84VEY8718 Ordered   17  Prostate Health Oral Capsule; TAKE 1 CAPSULE Daily Recorded   18  Simvastatin 20 MG Oral Tablet; take 1 tablet by mouth daily; Therapy: 04Pec7136 to (Evaluate:19Mar2017)  Requested for: 74IMT3856; Last    Rx:24Mar2016 Ordered  Medication List Reviewed: The medication list was reviewed and updated today  Allergies    1  Seasonal    Physical Exam    Constitutional   General appearance: No acute distress, well appearing and well nourished  Pulmonary   Auscultation of lungs: Clear to auscultation, equal breath sounds bilaterally, no wheezes, no rales, no rhonci  Cardiovascular   Auscultation of heart: Normal rate and rhythm, normal S1 and S2, without murmurs  Examination of extremities for edema and/or varicosities: Normal     Skin   Examination of the skin for lesions: Abnormal   2 incisions are noted to the right chest wall well approximated  Health Management  Health Maintenance   COLONOSCOPY; every 5 years; Next Due: 90ZXA5105; Overdue    Future Appointments    Date/Time Provider Specialty Site   01/03/2017 01:00 PM MARCIAL Snyder  Hematology [facility]cology [facility]  [facility]   12/21/2016 02:00 PM MARCIAL Hollis  Cardiology [facility]   05/01/2017 11:15 AM MARCIAL Hollis  Cardiology  [address]     Signatures   Electronically signed by : Lestine Scheuermann, CRNP;  Dec  8 2016 10:06AM EST (Author)    Electronically signed by : MARCIAL Gonsales ; Dec  8 2016 12:26PM EST

## 2018-01-14 VITALS
RESPIRATION RATE: 16 BRPM | WEIGHT: 165.5 LBS | TEMPERATURE: 97 F | OXYGEN SATURATION: 94 % | HEIGHT: 62 IN | HEART RATE: 88 BPM | DIASTOLIC BLOOD PRESSURE: 66 MMHG | BODY MASS INDEX: 30.46 KG/M2 | SYSTOLIC BLOOD PRESSURE: 126 MMHG

## 2018-01-14 NOTE — MISCELLANEOUS
Message   Recorded as Task   Date: 10/07/2016 12:16 PM, Created By: Adrian Hernandez   Task Name: Call Back   Assigned To: Calvert Gottron   Regarding Patient: Shayna Yin, Status: Active   Comment:    Tere Pham - 07 Oct 2016 12:16 PM     TASK CREATED  Caller: Self; Other; [phone] (Home)  PT WAS TOLD BY OUR OFFICE THAT HIS WBC IS UP TO 42 AND HE SHOULD START BACK ON IMBRUVICA  HE SAW DR ePter Krishnamurthy AND HE SAID EVERYTHING WAS "OK"  HE WOULD LIKE CLARIFICATION  Leopold Soulier - 07 Oct 2016 12:34 PM     TASK EDITED  spoke with pt, he will take Imbruvica as directed by Dr Abril Witt  f/u with Dr Abril Witt scheduled on 10/11/16  Active Problems    1  Abnormal chest xray (793 2) (R93 8)   2  Acute deep vein thrombosis (DVT) of femoral vein of left lower extremity (453 41)   (I82 412)   3  Allergic rhinitis (477 9) (J30 9)   4  Anemia (285 9) (D64 9)   5  Anticoagulated (V58 61) (Z79 01)   6  Anxiety (300 00) (F41 9)   7  Benign essential hypertension (401 1) (I10)   8  Cardiomyopathy (425 4) (I42 9)   9  Chronic diastolic heart failure (937 58) (I50 32)   10  Colon cancer screening (V76 51) (Z12 11)   11  Congestive heart failure (428 0) (I50 9)   12  Hyperlipidemia (272 4) (E78 5)   13  Hypertension (401 9) (I10)   14  Lymphocytosis (288 61) (D72 820)   15  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)   16  Morbid or severe obesity due to excess calories (278 01) (E66 01)   17  Myositis (729 1) (M60 9)   18  Need for pneumococcal vaccine (V03 82) (Z23)   19  Need for prophylactic vaccination and inoculation against influenza (V04 81) (Z23)   20  Obesity (278 00) (E66 9)   21  Paroxysmal atrial fibrillation (427 31) (I48 0)   22  Pleural effusion (511 9) (J90)   23  Prostatic hyperplasia, benign localized (600 20) (N40 0)   24  Splenomegaly (789 2) (R16 1)   25  Thrombocytopenia (287 5) (D69 6)   26  Trigeminal neuralgia (350 1) (G50 0)    Current Meds   1   Acidophilus/Pectin Oral Capsule Recorded 2  Avodart 0 5 MG Oral Capsule (Dutasteride); TAKE 1 CAPSULE Daily Recorded   3  Desonide 0 05 % External Cream; APPLY SPARINGLY TO Left orbit 2 TO 3 TIMES   DAILY as needed; Therapy: 69Fqm7462 to (Last Rx:47Fcj1269)  Requested for: 17ZBY3876; Status: ACTIVE -   Transmit to Pharmacy - Awaiting Verification Ordered   4  Eliquis 5 MG Oral Tablet; 1 tab bid; Therapy: 75VMJ6705 to (Last Mort Ratke)  Requested for: 51Pec8927 Ordered   5  Escitalopram Oxalate 10 MG Oral Tablet; TAKE 1 TABLET DAILY; Therapy: 41DBV2926 to (Evaluate:18Oct2016)  Requested for: 26Prw6187; Last   Rx:23Qiz0081 Ordered   6  FaBB 2 2-25-1 MG Oral Tablet; take 1 tablet by mouth every day; Therapy: 27IZE9108 to (Evaluate:99Jul4625)  Requested for: 64Mln7995; Last   Rx:06Gpw1216 Ordered   7  Flomax 0 4 MG Oral Capsule (Tamsulosin HCl); 1 PO QD Recorded   8  Fluticasone Propionate 50 MCG/ACT Nasal Suspension; USE 2 SPRAYS IN EACH   NOSTRIL ONCE DAILY; Therapy: 22Jso8358 to (Evaluate:11Oct2016)  Requested for: 39SZV4703; Last   Rx:82Msc2107 Ordered   9  Furosemide 40 MG Oral Tablet; Take 1 tablet daily; Therapy: 72MIO5934 to (Evaluate:06Jan2017)  Requested for: 14FXR2429; Last   Rx:19Llu8273 Ordered   10  Imbruvica 140 MG Oral Capsule; 4 (560 mg) PO DAILY; Therapy: 88Duh5041 to (Last Rx:06Oct2016)  Requested for: 31EZQ2302 Ordered   11  Klor-Con M20 20 MEQ Oral Tablet Extended Release; 1/2  tab daily  Requested for:    65Ole7550; Last Rx:27Nov2015 Ordered   12  Losartan Potassium 25 MG Oral Tablet; Take 1 tablet daily; Therapy: 04YCK7222 to (Evaluate:20Mar2017)  Requested for: 67BTE9285; Last    Rx:27Ndt6766 Ordered   13  Metoprolol Tartrate 50 MG Oral Tablet (Lopressor); TAKE 1/2 TABLET TWICE DAILY; Therapy: 94MVI6414 to (Acosta Parikh)  Requested for: 35SHY5957; Last    Rx:06Nov2015 Ordered   14  Multivitamins Oral Capsule; TAKE 1 CAPSULE Daily Recorded   15   Prostate Health Oral Capsule; TAKE 1 CAPSULE Daily Recorded 16  Revlimid 15 MG Oral Capsule; TAKE 1 CAPSULE DAILY; Therapy: 46XON3438 to (Evaluate:26Oct2016)  Requested for: 02AEC0589; Last    Rx:05Oct2016 Ordered   17  Simvastatin 20 MG Oral Tablet; take 1 tablet by mouth daily; Therapy: 21Apr2014 to (Evaluate:19Mar2017)  Requested for: 24Mar2016; Last    Rx:24Mar2016 Ordered    Allergies    1  No Known Drug Allergies    2   Seasonal    Signatures   Electronically signed by : Nate Lakhani RN; Oct  7 2016 12:34PM EST                       (Author)

## 2018-01-17 NOTE — PROCEDURES
Procedures by Lyla Blunt RN at  11/21/2016  9:42 AM      Author:  Lyla Blunt RN Service:  (none) Author Type:  Registered Nurse     Filed:  11/21/2016  9:46 AM Date of Service:  11/21/2016  9:42 AM Status:  Signed     :  Lyla Blunt RN (Registered Nurse)         Procedure Orders:       1  Insert PICC line [[id number]] ordered by Sukhwinder Dai MD at 11/20/16 1004                    Insert PICC line  Date/Time: 11/21/2016 9:42 AM  Performed by: KEILA BECK  Authorized by: Presley Marie     Patient location:  Bedside  Other Assisting Provider:  Yes (comment)    Consent:     Consent obtained:  Written (obtained by physician)  Universal protocol:     Procedure explained and questions answered to patient or proxy's satisfaction: yes      Relevant documents present and verified: yes      Test results available and properly labeled: yes       Imaging studies available: yes      Required blood products, implants, devices, and special equipment available: yes      Site/side marked: yes      Immediately prior to procedure, a time out was called: yes      Patient identity confirmed:  Verbally with patient and arm band  Pre-procedure details:     Hand hygiene: Hand hygiene performed prior to insertion      Sterile barrier technique: All elements of maximal sterile technique followed      Skin preparation:  ChloraPrep    Skin preparation agent: Skin preparation agent completely dried prior to procedure    Indications:     PICC line indications: chemotherapy    Anesthesia (see MAR for exact dosages):      Anesthesia method:  Local infiltration    Local anesthetic:  Lidocaine 1% w/o epi (2ml)  Procedure details:     Location:  Basilic    Vessel type: vein      Laterality:  Left    Approach: percutaneous technique used      Patient position:  Flat    Procedural supplies:  Double lumen    Catheter size:  5 Fr    Landmarks identified: yes      Ultrasound guidance: yes      Successful placement: yes Vessel of catheter tip end:  SVC    Total catheter length (cm):  48    Catheter out on skin (cm):  1    Max flow rate:  999 ml/hr  Post-procedure details:     Post-procedure:  Dressing applied and securement device placed    Assessment:  Blood return through all ports and free fluid flow    Patient tolerance of procedure:   Tolerated well, no immediate complications    Observer: Yes      Observer name:  CONTRERAS Finley  Comments:      Confirmed by Yessica Soto                     Received for:Provider  EPIC   Nov 21 2016  9:47AM [facility] Standard Time

## 2018-01-18 NOTE — MISCELLANEOUS
Message   Recorded as Task   Date: 01/09/2017 09:38 AM, Created By: Lexa Marquez   Task Name: Care Coordination   Assigned To: Lyla Tillman   Regarding Patient: Kranthi Iglesias, Status: Active   Comment:    OksanaIza - 09 Jan 2017 9:38 AM     TASK CREATED  SL[facility] LAB WBC 53 79 PLT 30,000   Lyla Tillman - 09 Jan 2017 11:04 AM     TASK EDITED  Per Karole Jurist called pt lmom to repeat labs on thursday as well as him rec  a transufion that thursday as well  Active Problems    1  Abnormal chest xray (793 2) (R93 8)   2  Acute deep vein thrombosis (DVT) of femoral vein of left lower extremity (453 41)   (I82 412)   3  Allergic rhinitis (477 9) (J30 9)   4  Anemia (285 9) (D64 9)   5  Anticoagulated (V58 61) (Z79 01)   6  Anxiety (300 00) (F41 9)   7  Benign essential hypertension (401 1) (I10)   8  Cardiomyopathy (425 4) (I42 9)   9  Chronic diastolic heart failure (555 46) (I50 32)   10  Colon cancer screening (V76 51) (Z12 11)   11  Congestive heart failure (428 0) (I50 9)   12  DVT (deep venous thrombosis) (453 40) (I82 409)   13  Flu vaccine need (V04 81) (Z23)   14  Hyperlipidemia (272 4) (E78 5)   15  Hypertension (401 9) (I10)   16  Lumbar radiculopathy (724 4) (M54 16)   17  Lymphocytosis (288 61) (D72 820)   18  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)   19  Morbid or severe obesity due to excess calories (278 01) (E66 01)   20  Myositis (729 1) (M60 9)   21  Need for pneumococcal vaccine (V03 82) (Z23)   22  Need for prophylactic vaccination and inoculation against influenza (V04 81) (Z23)   23  Obesity (278 00) (E66 9)   24  Paroxysmal atrial fibrillation (427 31) (I48 0)   25  Pleural effusion (511 9) (J90)   26  Prostatic hyperplasia, benign localized (600 20) (N40 0)   27  Splenomegaly (789 2) (R16 1)   28  Thrombocytopenia (287 5) (D69 6)   29  Trigeminal neuralgia (350 1) (G50 0)    Current Meds   1  Avodart 0 5 MG Oral Capsule (Dutasteride); TAKE 1 CAPSULE Daily Recorded   2   Clindamycin Phosphate 1 % External Gel; APPLY AND GENTLY MASSAGE INTO   AFFECTED AREA(S) TWICE DAILY; Therapy: 10MPT5715 to (Last Rx:01Nov2016)  Requested for: 44VWQ8349 Ordered   3  Desonide 0 05 % External Cream; APPLY SPARINGLY TO Left orbit 2 TO 3 TIMES   DAILY as needed; Therapy: 58Uic9907 to (Last Levels Croissant)  Requested for: 45Afn6073 Ordered   4  Escitalopram Oxalate 10 MG Oral Tablet; TAKE 1 TABLET DAILY; Therapy: 03UFO7101 to (Evaluate:26Apr2017)  Requested for: 14WLM2419; Last   Rx:28Oct2016 Ordered   5  FaBB 2 2-25-1 MG Oral Tablet; take 1 tablet by mouth every day; Therapy: 81TRL0891 to (Evaluate:17Xof5769)  Requested for: 20Apt8728; Last   Rx:32Oid1405 Ordered   6  Flomax 0 4 MG Oral Capsule (Tamsulosin HCl); 1 PO QD Recorded   7  Fluticasone Propionate 50 MCG/ACT Nasal Suspension; USE 2 SPRAYS IN EACH   NOSTRIL ONCE DAILY; Therapy: 80Wen9364 to (Evaluate:11Oct2016)  Requested for: 50GOE4438; Last   Rx:29Ird7763 Ordered   8  Furosemide 40 MG Oral Tablet; Take 1 tablet daily; Therapy: 45DLS9549 to (Evaluate:06Jan2017)  Requested for: 29DGR9525; Last   Rx:30Tdy3339 Ordered   9  Klor-Con M20 20 MEQ Oral Tablet Extended Release; 1/2  tab daily  Requested for:   27Nov2015; Last Rx:27Nov2015 Ordered   10  Losartan Potassium 25 MG Oral Tablet; Take 1 tablet daily; Therapy: 53JRV3304 to (Evaluate:20Mar2017)  Requested for: 14IIL4192; Last    Rx:14Iqg3223 Ordered   11  MethylPREDNISolone 4 MG Oral Tablet Therapy Pack (Medrol); take as directed; Therapy: 28WGD0921 to (Last Rx:89Xyg6090)  Requested for: 43Qgi3425 Ordered   12  Metoprolol Tartrate 50 MG Oral Tablet (Lopressor); TAKE 1/2 TABLET TWICE DAILY; Therapy: 21HEE1565 to (Mary Kate Miguel)  Requested for: 62NTR9887; Last    Rx:28Nov2016 Ordered   13  Multivitamins Oral Capsule; TAKE 1 CAPSULE Daily Recorded   14  Ondansetron 4 MG Oral Tablet Dispersible; 1 by mouth every 6 hours as needed for    nausea/vomiting;     Therapy: 29ZPY6179 to (Last Rx:16Nov2016) Requested for: 73HAB6446 Ordered   15  Oxycodone-Acetaminophen 5-325 MG Oral Tablet; TAKE 1 TO 2 TABLETS EVERY 4    HOURS AS NEEDED FOR ONGOING PAIN;    Therapy: 40ZZA7984 to (Evaluate:13Jan2017); Last Rx:06Jan2017 Ordered   16  PredniSONE 50 MG Oral Tablet; 1 PO daily Day 1-5 of chemotherapy treatment; Therapy: 38WEE9096 to (Last Rx:10Pqw8266)  Requested for: 54WLV1144 Ordered   17  Prostate Health Oral Capsule; TAKE 1 CAPSULE Daily Recorded   18  Simvastatin 20 MG Oral Tablet; take 1 tablet by mouth daily; Therapy: 21Apr2014 to (Evaluate:19Mar2017)  Requested for: 24Mar2016; Last    Rx:24Mar2016 Ordered    Allergies    1  Rituxan    2   Seasonal    Signatures   Electronically signed by : Sanjuana Servin, ; Jan 9 2017 11:04AM EST                       (Author)

## 2018-01-18 NOTE — RESULT NOTES
Verified Results  ECHO FOLLOW UP/LIMITED 68Fbb4696 09:45AM [place] Order Number: [id number]     Test Name Result Flag Reference   ECHO FOLLOW UP/LIMITED (Report)     [facility], [address] [place] [address]   [phone]     Transthoracic Echocardiogram   2D, M-mode, Doppler, and Color Doppler     Study date: 08-Sep-2016     Patient: Lamont Gonzalez   MR number: [id number]   Account number: [de-identified]   : 1935   Age: 79 years   Gender: Male   Status: Outpatient   Location: [facility]   Height: 62 in   Weight: 174 lb   BP: 140/ 70 mmHg     Indications: cardiomyopathy  Diagnoses: I42 9 - Cardiomyopathy, unspecified     Sonographer: Nicholson RCS   Primary Physician: Matilde Hester MD   Referring Physician: Audra Grady MD   Group: [facility] [address] [place] [address]   Interpreting Physician: Audra Grady MD     SUMMARY     LEFT VENTRICLE:   Ejection fraction was estimated to be 50 % to 55 %  when compared to previous   echo in 2015, the LVEF has improved  RIGHT VENTRICLE:   Estimated peak pressure was at least 40 mmHg  LEFT ATRIUM:   The atrium was moderately dilated  RIGHT ATRIUM:   The atrium was moderately dilated  MITRAL VALVE:   There was mild annular calcification  There was mild regurgitation  AORTIC VALVE:   There was mild regurgitation  TRICUSPID VALVE:   There was mild regurgitation  SUMMARY MEASUREMENTS   Unspecified Scan Mode measurements:   Aortic Valve:  HR was 59 {H  B }/min  Left Ventricle:  HR was 62 {H  B }/min  HISTORY: PRIOR HISTORY: Congestive heart failure  Cardiomyopathy  Atrial   fibrillation  Risk factors: hypertension, a former history of cigarette use   (quitting more than one month ago), and a family history of coronary artery   disease  PROCEDURE: The study was performed in the [address] [place] [address]  This   was a routine study   The transthoracic approach was used  The study included   complete 2D imaging, M-mode, complete spectral Doppler, and color Doppler  Image quality was excellent  LEFT VENTRICLE: Size was normal  Ejection fraction was estimated to be 50 % to   55 %  when compared to previous echo in 11/2015, the LVEF has improved  RIGHT VENTRICLE: The size was normal  Systolic function was normal  Wall   thickness was normal  DOPPLER: Estimated peak pressure was at least 40 mmHg  LEFT ATRIUM: The atrium was moderately dilated  RIGHT ATRIUM: The atrium was moderately dilated  MITRAL VALVE: There was mild annular calcification  Valve structure was normal    There was normal leaflet separation  DOPPLER: The transmitral velocity was   within the normal range  There was no evidence for stenosis  There was mild   regurgitation  AORTIC VALVE: The valve was trileaflet  Leaflets exhibited normal thickness and   normal cuspal separation  DOPPLER: Transaortic velocity was within the normal   range  There was no evidence for stenosis  There was mild regurgitation  TRICUSPID VALVE: The valve structure was normal  There was normal leaflet   separation  DOPPLER: The transtricuspid velocity was within the normal range  There was no evidence for stenosis  There was mild regurgitation  PULMONIC VALVE: Leaflets exhibited normal thickness, no calcification, and   normal cuspal separation  DOPPLER: The transpulmonic velocity was within the   normal range  There was no regurgitation  PERICARDIUM: There was no pericardial effusion  The pericardium was normal in   appearance  AORTA: The root exhibited normal size  SYSTEM MEASUREMENT TABLES     2D   LA Dimension (2D): 5 6 cm     Apical four chamber   4 chamber Left Atrium Volume Index; Planimetry; End Systole; Apical four   chamber;: 27 43 cm2 Left Ventricular End Diastolic Volume; Method of Disks,   Single Plane;  Apical four chamber;: 86 9 ml Left Ventricular End Systolic   Volume; Method of Disks, Single Plane; Apical four chamber;: 25 1 ml Right   Atrium Systolic Area; Planimetry; End Systole; Apical four chamber;: 27 78 cm2   Right Ventricular Internal Diastolic Dimension; End Diastole; Apical four   chamber;: 45 4 mm   TAPSE: 29 7 mm     Unspecified Scan Mode   Aortic Root Diameter; End Systole;: 33 mm   HR: 59 {H  B }/min   Cardiac Output; Teichholz; Systole;: 4 23 L/min   HR: 62 {H  B }/min   Interventricular Septum Diastolic Thickness; Teichholz; End Diastole;: 9 9 mm   Left Ventricle Internal End Diastolic Dimension; Teichholz;: 48 7 mm   Left Ventricle Internal Systolic Dimension; Teichholz; End Systole;: 33 3 mm   Left Ventricle Posterior Wall Diastolic Thickness; Teichholz; End Diastole;:   9 4 mm   Left Ventricular Ejection Fraction; Teichholz;: 59 4 %   Stroke volume;  Teichholz; Systole;: 66 1 ml   Mitral Valve Area; Area by Pressure Half-Time; Systole;: 2 75 cm2   Mitral Valve E to A Ratio; Systole;: 1 09   Maximum Tricuspid valve regurgitation pressure gradient; Regurgitant Flow;   Systole;: 38 4 mm[Hg]     [address] [facility]redited [facility]     Prepared and electronically signed by     Sandi Romero MD   Signed 09-Sep-2016 16:14:43